# Patient Record
Sex: FEMALE | Race: WHITE | NOT HISPANIC OR LATINO | Employment: UNEMPLOYED | URBAN - NONMETROPOLITAN AREA
[De-identification: names, ages, dates, MRNs, and addresses within clinical notes are randomized per-mention and may not be internally consistent; named-entity substitution may affect disease eponyms.]

---

## 2023-08-20 ENCOUNTER — APPOINTMENT (OUTPATIENT)
Dept: GENERAL RADIOLOGY | Facility: HOSPITAL | Age: 38
DRG: 638 | End: 2023-08-20
Payer: COMMERCIAL

## 2023-08-20 ENCOUNTER — APPOINTMENT (OUTPATIENT)
Dept: CT IMAGING | Facility: HOSPITAL | Age: 38
DRG: 638 | End: 2023-08-20
Payer: COMMERCIAL

## 2023-08-20 ENCOUNTER — HOSPITAL ENCOUNTER (INPATIENT)
Facility: HOSPITAL | Age: 38
LOS: 1 days | Discharge: LEFT BEFORE DISCHARGE | DRG: 638 | End: 2023-08-22
Attending: EMERGENCY MEDICINE | Admitting: HOSPITALIST
Payer: COMMERCIAL

## 2023-08-20 DIAGNOSIS — M86.071 ACUTE HEMATOGENOUS OSTEOMYELITIS OF RIGHT FOOT: Primary | ICD-10-CM

## 2023-08-20 DIAGNOSIS — M86.9 OSTEOMYELITIS OF RIGHT FOOT, UNSPECIFIED TYPE: ICD-10-CM

## 2023-08-20 LAB
ALBUMIN SERPL-MCNC: 3.5 G/DL (ref 3.5–5.2)
ALBUMIN/GLOB SERPL: 0.8 G/DL
ALP SERPL-CCNC: 82 U/L (ref 39–117)
ALT SERPL W P-5'-P-CCNC: <5 U/L (ref 1–33)
ANION GAP SERPL CALCULATED.3IONS-SCNC: 12 MMOL/L (ref 5–15)
ANISOCYTOSIS BLD QL: NORMAL
AST SERPL-CCNC: 12 U/L (ref 1–32)
BASOPHILS # BLD AUTO: 0.12 10*3/MM3 (ref 0–0.2)
BASOPHILS NFR BLD AUTO: 1.3 % (ref 0–1.5)
BILIRUB SERPL-MCNC: <0.2 MG/DL (ref 0–1.2)
BUN SERPL-MCNC: 12 MG/DL (ref 6–20)
BUN/CREAT SERPL: 15.2 (ref 7–25)
CALCIUM SPEC-SCNC: 9.4 MG/DL (ref 8.6–10.5)
CHLORIDE SERPL-SCNC: 105 MMOL/L (ref 98–107)
CO2 SERPL-SCNC: 23 MMOL/L (ref 22–29)
CREAT SERPL-MCNC: 0.79 MG/DL (ref 0.57–1)
CRP SERPL-MCNC: 4.45 MG/DL (ref 0–0.5)
D-LACTATE SERPL-SCNC: 1.2 MMOL/L (ref 0.5–2)
DEPRECATED RDW RBC AUTO: 53.6 FL (ref 37–54)
EGFRCR SERPLBLD CKD-EPI 2021: 97.7 ML/MIN/1.73
EOSINOPHIL # BLD AUTO: 0.38 10*3/MM3 (ref 0–0.4)
EOSINOPHIL NFR BLD AUTO: 4.1 % (ref 0.3–6.2)
ERYTHROCYTE [DISTWIDTH] IN BLOOD BY AUTOMATED COUNT: 19.5 % (ref 12.3–15.4)
ERYTHROCYTE [SEDIMENTATION RATE] IN BLOOD: 124 MM/HR (ref 0–20)
GLOBULIN UR ELPH-MCNC: 4.4 GM/DL
GLUCOSE SERPL-MCNC: 93 MG/DL (ref 65–99)
HCG SERPL QL: NEGATIVE
HCT VFR BLD AUTO: 29.9 % (ref 34–46.6)
HGB BLD-MCNC: 8.6 G/DL (ref 12–15.9)
HOLD SPECIMEN: NORMAL
HYPOCHROMIA BLD QL: NORMAL
IMM GRANULOCYTES # BLD AUTO: 0.02 10*3/MM3 (ref 0–0.05)
IMM GRANULOCYTES NFR BLD AUTO: 0.2 % (ref 0–0.5)
LYMPHOCYTES # BLD AUTO: 3.54 10*3/MM3 (ref 0.7–3.1)
LYMPHOCYTES NFR BLD AUTO: 38 % (ref 19.6–45.3)
MAGNESIUM SERPL-MCNC: 1.9 MG/DL (ref 1.6–2.6)
MCH RBC QN AUTO: 22.2 PG (ref 26.6–33)
MCHC RBC AUTO-ENTMCNC: 28.8 G/DL (ref 31.5–35.7)
MCV RBC AUTO: 77.1 FL (ref 79–97)
MONOCYTES # BLD AUTO: 0.65 10*3/MM3 (ref 0.1–0.9)
MONOCYTES NFR BLD AUTO: 7 % (ref 5–12)
NEUTROPHILS NFR BLD AUTO: 4.6 10*3/MM3 (ref 1.7–7)
NEUTROPHILS NFR BLD AUTO: 49.4 % (ref 42.7–76)
NRBC BLD AUTO-RTO: 0 /100 WBC (ref 0–0.2)
PLATELET # BLD AUTO: 454 10*3/MM3 (ref 140–450)
PMV BLD AUTO: 10.4 FL (ref 6–12)
POTASSIUM SERPL-SCNC: 3.9 MMOL/L (ref 3.5–5.2)
PROCALCITONIN SERPL-MCNC: 0.04 NG/ML (ref 0–0.25)
PROT SERPL-MCNC: 7.9 G/DL (ref 6–8.5)
RBC # BLD AUTO: 3.88 10*6/MM3 (ref 3.77–5.28)
SMALL PLATELETS BLD QL SMEAR: NORMAL
SODIUM SERPL-SCNC: 140 MMOL/L (ref 136–145)
WBC NRBC COR # BLD: 9.31 10*3/MM3 (ref 3.4–10.8)
WHOLE BLOOD HOLD COAG: NORMAL
WHOLE BLOOD HOLD SPECIMEN: NORMAL

## 2023-08-20 PROCEDURE — 73630 X-RAY EXAM OF FOOT: CPT | Performed by: RADIOLOGY

## 2023-08-20 PROCEDURE — 85025 COMPLETE CBC W/AUTO DIFF WBC: CPT | Performed by: PHYSICIAN ASSISTANT

## 2023-08-20 PROCEDURE — 25010000002 HYDROMORPHONE 1 MG/ML SOLUTION: Performed by: EMERGENCY MEDICINE

## 2023-08-20 PROCEDURE — 84145 PROCALCITONIN (PCT): CPT | Performed by: PHYSICIAN ASSISTANT

## 2023-08-20 PROCEDURE — 36415 COLL VENOUS BLD VENIPUNCTURE: CPT

## 2023-08-20 PROCEDURE — 85652 RBC SED RATE AUTOMATED: CPT | Performed by: PHYSICIAN ASSISTANT

## 2023-08-20 PROCEDURE — 87040 BLOOD CULTURE FOR BACTERIA: CPT | Performed by: PHYSICIAN ASSISTANT

## 2023-08-20 PROCEDURE — 84703 CHORIONIC GONADOTROPIN ASSAY: CPT | Performed by: PHYSICIAN ASSISTANT

## 2023-08-20 PROCEDURE — 73630 X-RAY EXAM OF FOOT: CPT

## 2023-08-20 PROCEDURE — 87205 SMEAR GRAM STAIN: CPT | Performed by: PHYSICIAN ASSISTANT

## 2023-08-20 PROCEDURE — 83735 ASSAY OF MAGNESIUM: CPT | Performed by: PHYSICIAN ASSISTANT

## 2023-08-20 PROCEDURE — 83605 ASSAY OF LACTIC ACID: CPT | Performed by: PHYSICIAN ASSISTANT

## 2023-08-20 PROCEDURE — 99285 EMERGENCY DEPT VISIT HI MDM: CPT

## 2023-08-20 PROCEDURE — 86140 C-REACTIVE PROTEIN: CPT | Performed by: PHYSICIAN ASSISTANT

## 2023-08-20 PROCEDURE — 87070 CULTURE OTHR SPECIMN AEROBIC: CPT | Performed by: PHYSICIAN ASSISTANT

## 2023-08-20 PROCEDURE — 85007 BL SMEAR W/DIFF WBC COUNT: CPT | Performed by: PHYSICIAN ASSISTANT

## 2023-08-20 PROCEDURE — 73700 CT LOWER EXTREMITY W/O DYE: CPT

## 2023-08-20 PROCEDURE — 80053 COMPREHEN METABOLIC PANEL: CPT | Performed by: PHYSICIAN ASSISTANT

## 2023-08-20 PROCEDURE — 83036 HEMOGLOBIN GLYCOSYLATED A1C: CPT | Performed by: HOSPITALIST

## 2023-08-20 PROCEDURE — 73700 CT LOWER EXTREMITY W/O DYE: CPT | Performed by: RADIOLOGY

## 2023-08-20 RX ADMIN — HYDROMORPHONE HYDROCHLORIDE 1 MG: 1 INJECTION, SOLUTION INTRAMUSCULAR; INTRAVENOUS; SUBCUTANEOUS at 22:00

## 2023-08-20 NOTE — ED NOTES
MEDICAL SCREENING:    Reason for Visit: right foot infection    Patient initially seen in triage.  The patient was advised further evaluation and diagnostic testing will be needed, some of the treatment and testing will be initiated in the lobby in order to begin the process.  The patient will be returned to the waiting area for the time being and possibly be re-assessed by a subsequent ED provider.  The patient will be brought back to the treatment area in as timely manner as possible.      Caroline Us, PA  08/20/23 2053

## 2023-08-21 PROBLEM — M86.9 OSTEOMYELITIS OF RIGHT FOOT: Status: ACTIVE | Noted: 2023-08-21

## 2023-08-21 LAB
ALBUMIN SERPL-MCNC: 2.9 G/DL (ref 3.5–5.2)
ALBUMIN/GLOB SERPL: 0.7 G/DL
ALP SERPL-CCNC: 68 U/L (ref 39–117)
ALT SERPL W P-5'-P-CCNC: 6 U/L (ref 1–33)
ANION GAP SERPL CALCULATED.3IONS-SCNC: 11.7 MMOL/L (ref 5–15)
AST SERPL-CCNC: 11 U/L (ref 1–32)
BASOPHILS # BLD AUTO: 0.07 10*3/MM3 (ref 0–0.2)
BASOPHILS NFR BLD AUTO: 1 % (ref 0–1.5)
BILIRUB SERPL-MCNC: <0.2 MG/DL (ref 0–1.2)
BUN SERPL-MCNC: 9 MG/DL (ref 6–20)
BUN/CREAT SERPL: 12 (ref 7–25)
CALCIUM SPEC-SCNC: 8.7 MG/DL (ref 8.6–10.5)
CHLORIDE SERPL-SCNC: 105 MMOL/L (ref 98–107)
CO2 SERPL-SCNC: 22.3 MMOL/L (ref 22–29)
CREAT SERPL-MCNC: 0.75 MG/DL (ref 0.57–1)
CRP SERPL-MCNC: 2.98 MG/DL (ref 0–0.5)
DEPRECATED RDW RBC AUTO: 51.1 FL (ref 37–54)
EGFRCR SERPLBLD CKD-EPI 2021: 104 ML/MIN/1.73
EOSINOPHIL # BLD AUTO: 0.28 10*3/MM3 (ref 0–0.4)
EOSINOPHIL NFR BLD AUTO: 3.9 % (ref 0.3–6.2)
ERYTHROCYTE [DISTWIDTH] IN BLOOD BY AUTOMATED COUNT: 19 % (ref 12.3–15.4)
FLUAV RNA RESP QL NAA+PROBE: NOT DETECTED
FLUBV RNA ISLT QL NAA+PROBE: NOT DETECTED
GLOBULIN UR ELPH-MCNC: 4.1 GM/DL
GLUCOSE BLDC GLUCOMTR-MCNC: 103 MG/DL (ref 70–130)
GLUCOSE BLDC GLUCOMTR-MCNC: 105 MG/DL (ref 70–130)
GLUCOSE BLDC GLUCOMTR-MCNC: 106 MG/DL (ref 70–130)
GLUCOSE BLDC GLUCOMTR-MCNC: 107 MG/DL (ref 70–130)
GLUCOSE BLDC GLUCOMTR-MCNC: 113 MG/DL (ref 70–130)
GLUCOSE SERPL-MCNC: 85 MG/DL (ref 65–99)
HBA1C MFR BLD: 5 % (ref 4.8–5.6)
HCT VFR BLD AUTO: 28.2 % (ref 34–46.6)
HGB BLD-MCNC: 8.6 G/DL (ref 12–15.9)
IMM GRANULOCYTES # BLD AUTO: 0.02 10*3/MM3 (ref 0–0.05)
IMM GRANULOCYTES NFR BLD AUTO: 0.3 % (ref 0–0.5)
LYMPHOCYTES # BLD AUTO: 2.76 10*3/MM3 (ref 0.7–3.1)
LYMPHOCYTES NFR BLD AUTO: 38.5 % (ref 19.6–45.3)
MCH RBC QN AUTO: 22.6 PG (ref 26.6–33)
MCHC RBC AUTO-ENTMCNC: 30.5 G/DL (ref 31.5–35.7)
MCV RBC AUTO: 74 FL (ref 79–97)
MONOCYTES # BLD AUTO: 0.57 10*3/MM3 (ref 0.1–0.9)
MONOCYTES NFR BLD AUTO: 8 % (ref 5–12)
NEUTROPHILS NFR BLD AUTO: 3.46 10*3/MM3 (ref 1.7–7)
NEUTROPHILS NFR BLD AUTO: 48.3 % (ref 42.7–76)
NRBC BLD AUTO-RTO: 0 /100 WBC (ref 0–0.2)
PLATELET # BLD AUTO: 422 10*3/MM3 (ref 140–450)
PMV BLD AUTO: 10.4 FL (ref 6–12)
POTASSIUM SERPL-SCNC: 3.3 MMOL/L (ref 3.5–5.2)
POTASSIUM SERPL-SCNC: 4.5 MMOL/L (ref 3.5–5.2)
PROT SERPL-MCNC: 7 G/DL (ref 6–8.5)
RBC # BLD AUTO: 3.81 10*6/MM3 (ref 3.77–5.28)
SARS-COV-2 RNA RESP QL NAA+PROBE: NOT DETECTED
SODIUM SERPL-SCNC: 139 MMOL/L (ref 136–145)
WBC NRBC COR # BLD: 7.16 10*3/MM3 (ref 3.4–10.8)

## 2023-08-21 PROCEDURE — 25010000002 LABETALOL 5 MG/ML SOLUTION

## 2023-08-21 PROCEDURE — 25010000002 MORPHINE PER 10 MG: Performed by: INTERNAL MEDICINE

## 2023-08-21 PROCEDURE — 99222 1ST HOSP IP/OBS MODERATE 55: CPT | Performed by: SURGERY

## 2023-08-21 PROCEDURE — 80053 COMPREHEN METABOLIC PANEL: CPT | Performed by: HOSPITALIST

## 2023-08-21 PROCEDURE — 84132 ASSAY OF SERUM POTASSIUM: CPT | Performed by: HOSPITALIST

## 2023-08-21 PROCEDURE — 82948 REAGENT STRIP/BLOOD GLUCOSE: CPT

## 2023-08-21 PROCEDURE — 25010000002 MEROPENEM PER 100 MG: Performed by: HOSPITALIST

## 2023-08-21 PROCEDURE — 25010000002 VANCOMYCIN 5 G RECONSTITUTED SOLUTION: Performed by: HOSPITALIST

## 2023-08-21 PROCEDURE — 25010000002 HYDRALAZINE PER 20 MG: Performed by: HOSPITALIST

## 2023-08-21 PROCEDURE — 25010000002 ERTAPENEM PER 500 MG: Performed by: PHYSICIAN ASSISTANT

## 2023-08-21 PROCEDURE — 25010000002 VANCOMYCIN 5 G RECONSTITUTED SOLUTION: Performed by: PHYSICIAN ASSISTANT

## 2023-08-21 PROCEDURE — 87636 SARSCOV2 & INF A&B AMP PRB: CPT | Performed by: EMERGENCY MEDICINE

## 2023-08-21 PROCEDURE — 25010000002 MORPHINE PER 10 MG: Performed by: HOSPITALIST

## 2023-08-21 PROCEDURE — 85025 COMPLETE CBC W/AUTO DIFF WBC: CPT | Performed by: HOSPITALIST

## 2023-08-21 PROCEDURE — 86140 C-REACTIVE PROTEIN: CPT | Performed by: HOSPITALIST

## 2023-08-21 RX ORDER — POLYETHYLENE GLYCOL 3350 17 G/17G
17 POWDER, FOR SOLUTION ORAL DAILY PRN
Status: DISCONTINUED | OUTPATIENT
Start: 2023-08-21 | End: 2023-08-22 | Stop reason: HOSPADM

## 2023-08-21 RX ORDER — TOPIRAMATE 25 MG/1
100 TABLET ORAL DAILY
Status: CANCELLED | OUTPATIENT
Start: 2023-08-21

## 2023-08-21 RX ORDER — NICOTINE 21 MG/24HR
1 PATCH, TRANSDERMAL 24 HOURS TRANSDERMAL
Status: DISCONTINUED | OUTPATIENT
Start: 2023-08-21 | End: 2023-08-22 | Stop reason: HOSPADM

## 2023-08-21 RX ORDER — ALLOPURINOL 100 MG/1
100 TABLET ORAL DAILY
Status: CANCELLED | OUTPATIENT
Start: 2023-08-21

## 2023-08-21 RX ORDER — BUSPIRONE HYDROCHLORIDE 15 MG/1
15 TABLET ORAL 3 TIMES DAILY PRN
COMMUNITY

## 2023-08-21 RX ORDER — BUPROPION HYDROCHLORIDE 150 MG/1
150 TABLET, EXTENDED RELEASE ORAL 2 TIMES DAILY
Status: CANCELLED | OUTPATIENT
Start: 2023-08-21

## 2023-08-21 RX ORDER — SODIUM CHLORIDE 9 MG/ML
40 INJECTION, SOLUTION INTRAVENOUS AS NEEDED
Status: DISCONTINUED | OUTPATIENT
Start: 2023-08-21 | End: 2023-08-22 | Stop reason: HOSPADM

## 2023-08-21 RX ORDER — SEMAGLUTIDE 1.34 MG/ML
1 INJECTION, SOLUTION SUBCUTANEOUS WEEKLY
COMMUNITY

## 2023-08-21 RX ORDER — CLONIDINE HYDROCHLORIDE 0.3 MG/1
0.3 TABLET ORAL 2 TIMES DAILY
COMMUNITY

## 2023-08-21 RX ORDER — BUMETANIDE 1 MG/1
1 TABLET ORAL DAILY
COMMUNITY

## 2023-08-21 RX ORDER — INSULIN LISPRO 100 [IU]/ML
2-7 INJECTION, SOLUTION INTRAVENOUS; SUBCUTANEOUS
Status: DISCONTINUED | OUTPATIENT
Start: 2023-08-21 | End: 2023-08-22 | Stop reason: HOSPADM

## 2023-08-21 RX ORDER — CLONIDINE HYDROCHLORIDE 0.3 MG/1
0.3 TABLET ORAL 2 TIMES DAILY
Status: CANCELLED | OUTPATIENT
Start: 2023-08-21

## 2023-08-21 RX ORDER — VANCOMYCIN/0.9 % SOD CHLORIDE 1.5G/250ML
1500 PLASTIC BAG, INJECTION (ML) INTRAVENOUS EVERY 12 HOURS
Status: DISCONTINUED | OUTPATIENT
Start: 2023-08-21 | End: 2023-08-22 | Stop reason: HOSPADM

## 2023-08-21 RX ORDER — IBUPROFEN 800 MG/1
800 TABLET ORAL EVERY 8 HOURS PRN
Status: CANCELLED | OUTPATIENT
Start: 2023-08-21

## 2023-08-21 RX ORDER — LABETALOL HYDROCHLORIDE 5 MG/ML
10 INJECTION, SOLUTION INTRAVENOUS ONCE
Status: COMPLETED | OUTPATIENT
Start: 2023-08-21 | End: 2023-08-21

## 2023-08-21 RX ORDER — ALLOPURINOL 100 MG/1
100 TABLET ORAL DAILY
COMMUNITY

## 2023-08-21 RX ORDER — HYDROXYZINE HYDROCHLORIDE 25 MG/1
25 TABLET, FILM COATED ORAL NIGHTLY PRN
Status: DISCONTINUED | OUTPATIENT
Start: 2023-08-21 | End: 2023-08-22 | Stop reason: HOSPADM

## 2023-08-21 RX ORDER — NICOTINE POLACRILEX 4 MG
15 LOZENGE BUCCAL
Status: DISCONTINUED | OUTPATIENT
Start: 2023-08-21 | End: 2023-08-22 | Stop reason: HOSPADM

## 2023-08-21 RX ORDER — BUMETANIDE 1 MG/1
1 TABLET ORAL DAILY
Status: CANCELLED | OUTPATIENT
Start: 2023-08-21

## 2023-08-21 RX ORDER — GLUCAGON 1 MG/ML
1 KIT INJECTION
Status: DISCONTINUED | OUTPATIENT
Start: 2023-08-21 | End: 2023-08-22 | Stop reason: HOSPADM

## 2023-08-21 RX ORDER — AMOXICILLIN 250 MG
2 CAPSULE ORAL 2 TIMES DAILY
Status: DISCONTINUED | OUTPATIENT
Start: 2023-08-21 | End: 2023-08-22 | Stop reason: HOSPADM

## 2023-08-21 RX ORDER — SODIUM CHLORIDE 0.9 % (FLUSH) 0.9 %
20 SYRINGE (ML) INJECTION AS NEEDED
Status: DISCONTINUED | OUTPATIENT
Start: 2023-08-21 | End: 2023-08-22 | Stop reason: HOSPADM

## 2023-08-21 RX ORDER — SODIUM CHLORIDE 9 MG/ML
100 INJECTION, SOLUTION INTRAVENOUS CONTINUOUS
Status: DISCONTINUED | OUTPATIENT
Start: 2023-08-21 | End: 2023-08-22

## 2023-08-21 RX ORDER — SODIUM CHLORIDE 0.9 % (FLUSH) 0.9 %
10 SYRINGE (ML) INJECTION EVERY 12 HOURS SCHEDULED
Status: DISCONTINUED | OUTPATIENT
Start: 2023-08-21 | End: 2023-08-22 | Stop reason: HOSPADM

## 2023-08-21 RX ORDER — POTASSIUM CHLORIDE 20 MEQ/1
40 TABLET, EXTENDED RELEASE ORAL EVERY 4 HOURS
Status: COMPLETED | OUTPATIENT
Start: 2023-08-21 | End: 2023-08-21

## 2023-08-21 RX ORDER — HEPARIN SODIUM (PORCINE) LOCK FLUSH IV SOLN 100 UNIT/ML 100 UNIT/ML
5 SOLUTION INTRAVENOUS AS NEEDED
Status: DISCONTINUED | OUTPATIENT
Start: 2023-08-21 | End: 2023-08-22 | Stop reason: HOSPADM

## 2023-08-21 RX ORDER — GABAPENTIN 400 MG/1
800 CAPSULE ORAL EVERY 8 HOURS PRN
Status: CANCELLED | OUTPATIENT
Start: 2023-08-21

## 2023-08-21 RX ORDER — BISACODYL 5 MG/1
5 TABLET, DELAYED RELEASE ORAL DAILY PRN
Status: DISCONTINUED | OUTPATIENT
Start: 2023-08-21 | End: 2023-08-22 | Stop reason: HOSPADM

## 2023-08-21 RX ORDER — SODIUM CHLORIDE 0.9 % (FLUSH) 0.9 %
10 SYRINGE (ML) INJECTION AS NEEDED
Status: DISCONTINUED | OUTPATIENT
Start: 2023-08-21 | End: 2023-08-22 | Stop reason: HOSPADM

## 2023-08-21 RX ORDER — QUETIAPINE FUMARATE 300 MG/1
300 TABLET, FILM COATED ORAL NIGHTLY
Status: CANCELLED | OUTPATIENT
Start: 2023-08-21

## 2023-08-21 RX ORDER — BISACODYL 10 MG
10 SUPPOSITORY, RECTAL RECTAL DAILY PRN
Status: DISCONTINUED | OUTPATIENT
Start: 2023-08-21 | End: 2023-08-22 | Stop reason: HOSPADM

## 2023-08-21 RX ORDER — FERROUS SULFATE 325(65) MG
325 TABLET ORAL
COMMUNITY

## 2023-08-21 RX ORDER — MORPHINE SULFATE 2 MG/ML
2 INJECTION, SOLUTION INTRAMUSCULAR; INTRAVENOUS ONCE
Status: COMPLETED | OUTPATIENT
Start: 2023-08-21 | End: 2023-08-21

## 2023-08-21 RX ORDER — BUPROPION HYDROCHLORIDE 150 MG/1
150 TABLET, EXTENDED RELEASE ORAL 2 TIMES DAILY
COMMUNITY

## 2023-08-21 RX ORDER — TOPIRAMATE 100 MG/1
100 TABLET, FILM COATED ORAL DAILY
COMMUNITY

## 2023-08-21 RX ORDER — QUETIAPINE FUMARATE 300 MG/1
300 TABLET, FILM COATED ORAL NIGHTLY
COMMUNITY

## 2023-08-21 RX ORDER — HYDRALAZINE HYDROCHLORIDE 20 MG/ML
10 INJECTION INTRAMUSCULAR; INTRAVENOUS EVERY 6 HOURS PRN
Status: DISCONTINUED | OUTPATIENT
Start: 2023-08-21 | End: 2023-08-22 | Stop reason: HOSPADM

## 2023-08-21 RX ORDER — GABAPENTIN 800 MG/1
800 TABLET ORAL 4 TIMES DAILY PRN
COMMUNITY

## 2023-08-21 RX ORDER — MORPHINE SULFATE 2 MG/ML
2 INJECTION, SOLUTION INTRAMUSCULAR; INTRAVENOUS EVERY 4 HOURS PRN
Status: DISCONTINUED | OUTPATIENT
Start: 2023-08-21 | End: 2023-08-21

## 2023-08-21 RX ORDER — DEXTROSE MONOHYDRATE 25 G/50ML
25 INJECTION, SOLUTION INTRAVENOUS
Status: DISCONTINUED | OUTPATIENT
Start: 2023-08-21 | End: 2023-08-22 | Stop reason: HOSPADM

## 2023-08-21 RX ORDER — FERROUS SULFATE 325(65) MG
325 TABLET ORAL
Status: CANCELLED | OUTPATIENT
Start: 2023-08-21

## 2023-08-21 RX ORDER — LOSARTAN POTASSIUM 50 MG/1
50 TABLET ORAL
Status: DISCONTINUED | OUTPATIENT
Start: 2023-08-21 | End: 2023-08-22

## 2023-08-21 RX ORDER — IBUPROFEN 800 MG/1
800 TABLET ORAL EVERY 8 HOURS PRN
COMMUNITY

## 2023-08-21 RX ADMIN — LABETALOL HYDROCHLORIDE 10 MG: 5 INJECTION, SOLUTION INTRAVENOUS at 20:18

## 2023-08-21 RX ADMIN — NICOTINE TRANSDERMAL SYSTEM 1 PATCH: 21 PATCH, EXTENDED RELEASE TRANSDERMAL at 18:13

## 2023-08-21 RX ADMIN — Medication 10 ML: at 08:35

## 2023-08-21 RX ADMIN — SODIUM CHLORIDE 100 ML/HR: 9 INJECTION, SOLUTION INTRAVENOUS at 02:10

## 2023-08-21 RX ADMIN — MORPHINE SULFATE 4 MG: 4 INJECTION, SOLUTION INTRAMUSCULAR; INTRAVENOUS at 20:18

## 2023-08-21 RX ADMIN — Medication 10 ML: at 02:10

## 2023-08-21 RX ADMIN — LOSARTAN POTASSIUM 50 MG: 50 TABLET, FILM COATED ORAL at 18:13

## 2023-08-21 RX ADMIN — ERTAPENEM 1000 MG: 1 INJECTION INTRAMUSCULAR; INTRAVENOUS at 00:25

## 2023-08-21 RX ADMIN — MORPHINE SULFATE 4 MG: 4 INJECTION, SOLUTION INTRAMUSCULAR; INTRAVENOUS at 17:10

## 2023-08-21 RX ADMIN — MORPHINE SULFATE 4 MG: 4 INJECTION, SOLUTION INTRAMUSCULAR; INTRAVENOUS at 22:47

## 2023-08-21 RX ADMIN — Medication 10 ML: at 20:20

## 2023-08-21 RX ADMIN — MUPIROCIN 1 APPLICATION: 20 OINTMENT TOPICAL at 08:35

## 2023-08-21 RX ADMIN — MORPHINE SULFATE 4 MG: 4 INJECTION, SOLUTION INTRAMUSCULAR; INTRAVENOUS at 15:07

## 2023-08-21 RX ADMIN — MORPHINE SULFATE 2 MG: 2 INJECTION, SOLUTION INTRAMUSCULAR; INTRAVENOUS at 03:58

## 2023-08-21 RX ADMIN — MORPHINE SULFATE 4 MG: 4 INJECTION, SOLUTION INTRAMUSCULAR; INTRAVENOUS at 12:35

## 2023-08-21 RX ADMIN — VANCOMYCIN HYDROCHLORIDE 2500 MG: 5 INJECTION, POWDER, LYOPHILIZED, FOR SOLUTION INTRAVENOUS at 00:24

## 2023-08-21 RX ADMIN — POTASSIUM CHLORIDE 40 MEQ: 1500 TABLET, EXTENDED RELEASE ORAL at 10:22

## 2023-08-21 RX ADMIN — HYDRALAZINE HYDROCHLORIDE 10 MG: 20 INJECTION INTRAMUSCULAR; INTRAVENOUS at 23:04

## 2023-08-21 RX ADMIN — HYDROXYZINE HYDROCHLORIDE 25 MG: 25 TABLET, FILM COATED ORAL at 20:34

## 2023-08-21 RX ADMIN — HYDRALAZINE HYDROCHLORIDE 10 MG: 20 INJECTION INTRAMUSCULAR; INTRAVENOUS at 15:25

## 2023-08-21 RX ADMIN — MEROPENEM 1000 MG: 1 INJECTION, POWDER, FOR SOLUTION INTRAVENOUS at 08:35

## 2023-08-21 RX ADMIN — VANCOMYCIN HYDROCHLORIDE 1500 MG: 5 INJECTION, POWDER, LYOPHILIZED, FOR SOLUTION INTRAVENOUS at 18:13

## 2023-08-21 RX ADMIN — MORPHINE SULFATE 2 MG: 2 INJECTION, SOLUTION INTRAMUSCULAR; INTRAVENOUS at 02:32

## 2023-08-21 RX ADMIN — MEROPENEM 1000 MG: 1 INJECTION, POWDER, FOR SOLUTION INTRAVENOUS at 01:06

## 2023-08-21 RX ADMIN — MORPHINE SULFATE 4 MG: 4 INJECTION, SOLUTION INTRAMUSCULAR; INTRAVENOUS at 08:35

## 2023-08-21 RX ADMIN — POTASSIUM CHLORIDE 40 MEQ: 1500 TABLET, EXTENDED RELEASE ORAL at 06:07

## 2023-08-21 RX ADMIN — MEROPENEM 1000 MG: 1 INJECTION, POWDER, FOR SOLUTION INTRAVENOUS at 15:25

## 2023-08-21 RX ADMIN — MUPIROCIN 1 APPLICATION: 20 OINTMENT TOPICAL at 20:20

## 2023-08-21 NOTE — PROGRESS NOTES
Pharmacokinetics Service Note:    Ms. Solano has been evaluated for vancomycin dosing to treat her R diabetic foot infection.  She was receiving vancomycin at home post discharge from [facility] around 8/8.  She received a 2.5 gm dose ~ midnight here today.  Based on her estimated normal renal function, demographics, and predicted pharmacokinetic parameters, will continue dosing from this evening at 1.5 gm q12hrs to target an AUC range of 500-600 mg/L*hr.  Will plan to obtain a level within the next 48 hours to determine if any adjustments are needed.    Thank you.  Sandee Alfaro, Pharm.D.  8/21/2023  15:06 EDT

## 2023-08-21 NOTE — PROGRESS NOTES
Pharmacy was consulted to dose merrem for osteomyelitis. Based on an estimated CrCl of greater than 120mL/min, an extended infusion merrem dose of 1g q8hr has been ordered. Thank you for the consult.     Thank you,   Paola Martinez, PharmD  00:39 EDT

## 2023-08-21 NOTE — PLAN OF CARE
Goal Outcome Evaluation:              Outcome Evaluation: Pt is resting in bed at this time. Pt has complained of pain throughout the shift, PRN medication given per MAR. Wound care completed per orders. Will continue plan of care.

## 2023-08-21 NOTE — CASE MANAGEMENT/SOCIAL WORK
Discharge Planning Assessment   Alexys     Patient Name: Megan Solano  MRN: [id number]  Today's Date: 8/21/2023    Admit Date: 8/20/2023    Plan: CM spoke with pt at the bedside. Pt lives at home in [facility] with her father and plans to return at d/c. Pt has W/C, crutches and knee scooter per self owns. Pt has [facility] and uses [facility] for IV antibiotic infusions contacted per Rex to stop delivery for now. She goes to wound care clinic. PCP is Nupur MONCADA and she gets rx filled at [facility] in [place]. Pt has [employer] insurance and denies any issues with copays. CM will follow and assist as needed.   Discharge Needs Assessment       Row Name 08/21/23 0507       Living Environment    People in Home parent(s)    Name(s) of People in Home Father Colleen [phone]    Current Living Arrangements home    Primary Care Provided by self    Provides Primary Care For no one, unable/limited ability to care for self    Family Caregiver if Needed parent(s)    Quality of Family Relationships supportive    Able to Return to Prior Arrangements yes       Resource/Environmental Concerns    Resource/Environmental Concerns none    Transportation Concerns none       Transition Planning    Patient/Family Anticipates Transition to home    Transportation Anticipated family or friend will provide       Discharge Needs Assessment    Current Outpatient/Agency/Support Group infusion therapy, outpatient;homecare agency    Equipment Currently Used at Home wheelchair;crutches;other (see comments)  Knee scooter owns    Anticipated Changes Related to Illness none    Equipment Needed After Discharge none    Outpatient/Agency/Support Group Needs infusion therapy, outpatient;homecare agency;clinic(s)    Patient's Choice of Community Agency(s) Pt has [facility]              Discharge Plan       Row Name 08/21/23 3501       Plan    Plan CM spoke with pt at the bedside. Pt lives at home in [facility] with her father and plans to return at  d/c. Pt has W/C, crutches and knee scooter per self owns. Pt has [facility] and uses [facility] for IV antibiotic infusions contacted per Rex to stop delivery for now. She goes to wound care clinic. PCP is Nupur MONCADA and she gets rx filled at [facility] in [place]. Pt has [employer] insurance and denies any issues with copays. CM will follow and assist as needed.    Patient/Family in Agreement with Plan yes               Expected Discharge Date and Time       Expected Discharge Date Expected Discharge Time    Aug 24, 2023                 Harmony Hodgson, RN

## 2023-08-21 NOTE — ED PROVIDER NOTES
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 Pt states 5 different doctors have wanted to amputate her foot but she was hoping to save it. Has a PICC line she has been getting vancomycin and ertapenem through.   Pt has been diagnosed with Charcot foot several months ago and has been non weight bearing   Wants second opinion   Pt has hx of DM, tobacco abuse     History provided by:  Patient   used: No    Foot Pain  Severity:  Moderate  Onset quality:  Sudden  Duration:  9 months  Timing:  Constant  Progression:  Worsening  Chronicity:  Chronic  Relieved by:  Nothing  Worsened by:  Ambulation  Ineffective treatments:  None    Review of Systems    No past medical history on file.    Allergies   Allergen Reactions    Penicillins Hives and Shortness Of Breath    Amoxicillin Hives    Lisinopril Cough    Toradol [Ketorolac Tromethamine] Myalgia    Zofran [Ondansetron] GI Intolerance    Latex Rash       No past surgical history on file.    No family history on file.    Social History     Socioeconomic History    Marital status: Single           Objective   Physical Exam  Vitals and nursing note reviewed.   Constitutional:       Appearance: She is well-developed.   HENT:      Head: Normocephalic.   Cardiovascular:      Rate and Rhythm: Normal rate and regular rhythm.   Pulmonary:      Effort: Pulmonary effort is normal.      Breath sounds: Normal breath sounds.   Abdominal:      General: Bowel sounds are normal.      Palpations: Abdomen is soft.      Tenderness: There is no abdominal tenderness.   Musculoskeletal:         General: Normal range of motion.      Cervical back: Neck supple.   Skin:     General: Skin is warm and dry.   Neurological:      Mental Status: She is alert and oriented to person, place, and time.   Psychiatric:         Behavior: Behavior normal.         Thought Content: Thought content  normal.         Judgment: Judgment normal.       Procedures           ED Course  ED Course as of 08/21/23 0041   Mon Aug 21, 2023   0001 CT lower extremity rad interpreted:  1. Large soft tissue defect along the plantar aspect of the lateral  midfoot as above. There appears to be bony destruction within the  adjacent cuboid very suggestive of acute osteomyelitis. MRI is  recommended for further evaluation.  2. Mid foot collapse with findings consistent with Charcot arthropathy.   [RB]      ED Course User Index  [RB] Mau Batista II, PA      Results for orders placed or performed during the hospital encounter of 08/20/23   Wound Culture - Wound, Foot, Right    Specimen: Foot, Right; Wound   Result Value Ref Range    Gram Stain Occasional Epithelial cells seen     Gram Stain No organisms seen    Comprehensive Metabolic Panel    Specimen: Arm, Right; Blood   Result Value Ref Range    Glucose 93 65 - 99 mg/dL    BUN 12 6 - 20 mg/dL    Creatinine 0.79 0.57 - 1.00 mg/dL    Sodium 140 136 - 145 mmol/L    Potassium 3.9 3.5 - 5.2 mmol/L    Chloride 105 98 - 107 mmol/L    CO2 23.0 22.0 - 29.0 mmol/L    Calcium 9.4 8.6 - 10.5 mg/dL    Total Protein 7.9 6.0 - 8.5 g/dL    Albumin 3.5 3.5 - 5.2 g/dL    ALT (SGPT) <5 1 - 33 U/L    AST (SGOT) 12 1 - 32 U/L    Alkaline Phosphatase 82 39 - 117 U/L    Total Bilirubin <0.2 0.0 - 1.2 mg/dL    Globulin 4.4 gm/dL    A/G Ratio 0.8 g/dL    BUN/Creatinine Ratio 15.2 7.0 - 25.0    Anion Gap 12.0 5.0 - 15.0 mmol/L    eGFR 97.7 >60.0 mL/min/1.73   hCG, Serum, Qualitative    Specimen: Arm, Right; Blood   Result Value Ref Range    HCG Qualitative Negative Negative   Lactic Acid, Plasma    Specimen: Arm, Right; Blood   Result Value Ref Range    Lactate 1.2 0.5 - 2.0 mmol/L   Procalcitonin    Specimen: Arm, Right; Blood   Result Value Ref Range    Procalcitonin 0.04 0.00 - 0.25 ng/mL   Sedimentation Rate    Specimen: Arm, Right; Blood   Result Value Ref Range    Sed Rate 124 (H) 0 - 20 mm/hr    C-reactive Protein    Specimen: Arm, Right; Blood   Result Value Ref Range    C-Reactive Protein 4.45 (H) 0.00 - 0.50 mg/dL   Magnesium    Specimen: Arm, Right; Blood   Result Value Ref Range    Magnesium 1.9 1.6 - 2.6 mg/dL   CBC Auto Differential    Specimen: Arm, Right; Blood   Result Value Ref Range    WBC 9.31 3.40 - 10.80 10*3/mm3    RBC 3.88 3.77 - 5.28 10*6/mm3    Hemoglobin 8.6 (L) 12.0 - 15.9 g/dL    Hematocrit 29.9 (L) 34.0 - 46.6 %    MCV 77.1 (L) 79.0 - 97.0 fL    MCH 22.2 (L) 26.6 - 33.0 pg    MCHC 28.8 (L) 31.5 - 35.7 g/dL    RDW 19.5 (H) 12.3 - 15.4 %    RDW-SD 53.6 37.0 - 54.0 fl    MPV 10.4 6.0 - 12.0 fL    Platelets 454 (H) 140 - 450 10*3/mm3    Neutrophil % 49.4 42.7 - 76.0 %    Lymphocyte % 38.0 19.6 - 45.3 %    Monocyte % 7.0 5.0 - 12.0 %    Eosinophil % 4.1 0.3 - 6.2 %    Basophil % 1.3 0.0 - 1.5 %    Immature Grans % 0.2 0.0 - 0.5 %    Neutrophils, Absolute 4.60 1.70 - 7.00 10*3/mm3    Lymphocytes, Absolute 3.54 (H) 0.70 - 3.10 10*3/mm3    Monocytes, Absolute 0.65 0.10 - 0.90 10*3/mm3    Eosinophils, Absolute 0.38 0.00 - 0.40 10*3/mm3    Basophils, Absolute 0.12 0.00 - 0.20 10*3/mm3    Immature Grans, Absolute 0.02 0.00 - 0.05 10*3/mm3    nRBC 0.0 0.0 - 0.2 /100 WBC   Scan Slide    Specimen: Arm, Right; Blood   Result Value Ref Range    Anisocytosis Mod/2+ None Seen    Hypochromia Mod/2+ None Seen    Platelet Estimate Increased Normal   Green Top (Gel)   Result Value Ref Range    Extra Tube Hold for add-ons.    Lavender Top   Result Value Ref Range    Extra Tube hold for add-on    Light Blue Top   Result Value Ref Range    Extra Tube Hold for add-ons.                                            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 Pt states 5 different doctors have wanted to amputate her foot but she was hoping to save it. Has a PICC line she has been getting vancomycin and ertapenem through.   Pt has  been diagnosed with Charcot foot several months ago and has been non weight bearing   Wants second opinion   Pt has hx of DM, tobacco abuse     Problems Addressed:  Osteomyelitis of right foot, unspecified type: acute illness or injury     Details: Very concerning findings on CT imaging of the right foot showing osteomyelitis.  Would feel this would probably result in very poor prognosis of amputation.  Patient is aware that amputation is definitely within the possibility.  Patient has been seen by other facilities and unfortunately has received the same news.  Currently receiving outpatient antibiotics through a PICC line in her left upper extremity.  Patient is on vancomycin and Invanz.    Amount and/or Complexity of Data Reviewed  External Data Reviewed: notes.  Labs: ordered.  Radiology: ordered. Decision-making details documented in ED Course.    Risk  Decision regarding hospitalization.        Final diagnoses:   Osteomyelitis of right foot, unspecified type       ED Disposition  ED Disposition       ED Disposition   Decision to Admit    Condition   --    Comment   Level of Care: Med/Surg [1]   Diagnosis: Osteomyelitis of right foot [647083]   Admitting Physician: TANIYA WATTS [[id number]]   Attending Physician: TANIYA WATTS [[id number]]   Certification: I Certify That Inpatient Hospital Services Are Medically Necessary For Greater Than 2 Midnights                 No follow-up provider specified.       Medication List      No changes were made to your prescriptions during this visit.            Mau Batista II, PA  08/21/23 0041

## 2023-08-21 NOTE — PLAN OF CARE
Goal Outcome Evaluation:  Plan of Care Reviewed With: patient        Progress: no change  Outcome Evaluation: Pt. arrived from ER this shift, physical assesment complete. Pt did not want full skin assesment, denies any further issues at this time. Pt. refused surgery bath and NPO status. PA aware. Pt ambulated to and from bathroom this shift. Wound care on R foot completed with MD at bedside. Pt. had complaints of pain, PRN meds given, see MAR. No acute changes at this time. Will continue with plan of care.

## 2023-08-21 NOTE — H&P
Hospitalist History and Physical        Patient Identification  Name: Megan Solano  Age/Sex: 38 y.o. female  :  1985        MRN: [id number]  Visit Number: [id number]  Admit Date: 2023   PCP: Nupur Cohn APRN          Chief complaint painful right foot ulcer, worsening drainage and redness    History of Present Illness:  Patient is a 38 y.o. female with history of charcot foot due to type II DM (insulin dependent), HTN and psoriatic arthritis who was recently admitted to [facility] earlier this month for management of diabetic ulcer on the arch of her right foot. She was found to have osteomyelitis and surgery recommended amputation of the foot, but she refused, so she states two bones were resected from her foot instead. She was placed on IV vancomycin and invanz and ultimately discharged home around 23 with a PICC line and instructions to continue IV antibiotics for 6 additional weeks. She has been going a wound care clinic since discharge as well. However, despite taking the antibiotics and going to wound care clinic, her wound has worsened in severity, with increased pain, surrounding redness, and purulent drainage, along with streaking redness up into her lower leg in recent days. As a result, she came to our ED to get a second opinion regarding her foot. In the ED, she was noted to be moderately hypertensive with SBP up to 179. She was not tachycardic, however. Labs showed elevated CRP 4.45, elevated sed rate 124, but normal WBC. Hemoglobin was low at 8.6 but the last records we have for comparison are from , at which time it was 14. CT right lower extremity showed large soft tissue defect along the plantar aspect of the lateral midfoot with appearance of bony destruction within the adjacent cuboid very suggestive of acute osteomyelitis.     Review of Systems  Review of Systems   Constitutional:  Negative for activity change, appetite change, chills,  diaphoresis, fatigue, fever and unexpected weight change.   HENT:  Negative for congestion, postnasal drip, rhinorrhea, sinus pressure, sinus pain and sore throat.    Eyes:  Negative for photophobia, pain, discharge, redness, itching and visual disturbance.   Respiratory:  Negative for cough, shortness of breath and wheezing.    Cardiovascular:  Positive for leg swelling (right lower leg). Negative for chest pain and palpitations.   Gastrointestinal:  Negative for abdominal distention, abdominal pain, constipation, diarrhea, nausea and vomiting.   Endocrine: Negative for cold intolerance, heat intolerance, polydipsia, polyphagia and polyuria.   Genitourinary:  Negative for difficulty urinating, dysuria, flank pain, frequency and hematuria.   Musculoskeletal:  Positive for joint swelling (right ankle) and myalgias (right foot and lower leg). Negative for arthralgias, back pain, neck pain and neck stiffness.   Skin:  Positive for color change (redness right foot streaking up lower leg) and wound (plantar surface right lateral foot). Negative for pallor and rash.   Neurological:  Negative for dizziness, tremors, seizures, syncope, weakness, light-headedness, numbness and headaches.   Hematological:  Negative for adenopathy. Does not bruise/bleed easily.   Psychiatric/Behavioral:  Negative for agitation, behavioral problems and confusion.      History  Past Medical History:   Diagnosis Date    Charcot foot due to diabetes mellitus     right foot    Hypertension     Psoriatic arthritis     Type II diabetes mellitus     insulin dependent     Past Surgical History:   Procedure Laterality Date    CHOLECYSTECTOMY      FOOT SURGERY Right     had 2 bones removed early Aug 2023 for management of osteomyelitis     History reviewed. No pertinent family history.  Social History     Tobacco Use    Smoking status: Every Day     Packs/day: 1.00     Years: 10.00     Pack years: 10.00     Types: Cigarettes     Passive exposure: Current     Smokeless tobacco: Never   Vaping Use    Vaping Use: Some days    Substances: Nicotine    Devices: Disposable    Passive vaping exposure: Yes   Substance Use Topics    Alcohol use: Never    Drug use: Never     No medications prior to admission.     Allergies:  Penicillins, Amoxicillin, Lisinopril, Toradol [ketorolac tromethamine], Zofran [ondansetron], and Latex    Objective     Vital Signs  Temp:  [98.1 øF (36.7 øC)-99 øF (37.2 øC)] 98.1 øF (36.7 øC)  Heart Rate:  [72-87] 75  Resp:  [16-18] 18  BP: (140-210)/(76-92) 210/90  Body mass index is 37.57 kg/mý.    Physical Exam:  Physical Exam  Constitutional:       General: She is not in acute distress.     Appearance: She is obese. She is not ill-appearing (but appears uncomfortable due to pain).   HENT:      Head: Normocephalic and atraumatic.      Right Ear: External ear normal.      Left Ear: External ear normal.      Nose: Nose normal.      Mouth/Throat:      Mouth: Mucous membranes are moist.      Pharynx: Oropharynx is clear.   Eyes:      Extraocular Movements: Extraocular movements intact.      Conjunctiva/sclera: Conjunctivae normal.      Pupils: Pupils are equal, round, and reactive to light.   Cardiovascular:      Rate and Rhythm: Normal rate and regular rhythm.      Heart sounds: No murmur heard.     Comments: Pedal pulse palpable left foot, more diminished in right but likely due to edema  Pulmonary:      Effort: Pulmonary effort is normal. No respiratory distress.      Breath sounds: Normal breath sounds. No wheezing or rales.   Abdominal:      General: Abdomen is flat. Bowel sounds are normal. There is no distension.      Palpations: Abdomen is soft.      Tenderness: There is no abdominal tenderness.   Musculoskeletal:         General: Normal range of motion.      Cervical back: Normal range of motion and neck supple. No tenderness.      Right lower leg: Edema (2+ pitting) present.      Left lower leg: Edema (1+ pitting) present.   Lymphadenopathy:       Cervical: No cervical adenopathy.   Skin:     General: Skin is warm and dry.      Coloration: Skin is not jaundiced.      Findings: Erythema (right foot and lower leg) and lesion (plantar aspect of lateral right foot, about half dollar in diameter, deep tracking, muscle and bone visible. Appears to track through the foot completely with pinhole ulceration on dorsal aspect of lateral foot.) present. No bruising.      Comments: Findings consistent with venous stasis dermatitis bilateral lower legs.    Neurological:      General: No focal deficit present.      Mental Status: She is alert and oriented to person, place, and time.   Psychiatric:      Comments: Affect is tearful         Results Review:       Lab Results:  Results from last 7 days   Lab Units 08/20/23  1752   WBC 10*3/mm3 9.31   HEMOGLOBIN g/dL 8.6*   PLATELETS 10*3/mm3 454*     Results from last 7 days   Lab Units 08/20/23  1752   CRP mg/dL 4.45*     Results from last 7 days   Lab Units 08/20/23  1752   SODIUM mmol/L 140   POTASSIUM mmol/L 3.9   CHLORIDE mmol/L 105   CO2 mmol/L 23.0   BUN mg/dL 12   CREATININE mg/dL 0.79   CALCIUM mg/dL 9.4   GLUCOSE mg/dL 93     Results from last 7 days   Lab Units 08/20/23  1752   MAGNESIUM mg/dL 1.9     No results found for: HGBA1C  Results from last 7 days   Lab Units 08/20/23  1752   BILIRUBIN mg/dL <0.2   ALK PHOS U/L 82   AST (SGOT) U/L 12   ALT (SGPT) U/L <5                       I have reviewed the patient's laboratory results.    Imaging:  Imaging Results (Last 72 Hours)       Procedure Component Value Units Date/Time    CT Lower Extremity Right Without Contrast [[id number]] Collected: 08/20/23 2256     Updated: 08/20/23 2357    Narrative:      Examination: CT right lower extremity without contrast     CLINICAL HISTORY: Foot swelling. Infection. Diabetes.     COMPARISON: None.     TECHNIQUE: Contiguous 3 mm thick slices were obtained through the ankle  and foot without contrast. Coronal and sagittal reformats  were  performed.     FINDINGS:  There is abundant edema within the subcutaneous fat throughout the lower  leg. There is a large soft tissue defect along the plantar aspect of the  lateral midfoot. This extends to the plantar aspect of the cuboid and  adjacent third cuneiform. There is focal bony destruction along the  anterior margin of the lateral cuboid adjacent to the defect. Although  not entirely specific, the findings strongly suggest osteomyelitis. MRI  should be considered for further evaluation. Additionally, there is  marked malalignment with loss of the normal plantar arch and collapse of  the midfoot with superimposed degenerative changes. The findings are  consistent with Charcot arthropathy. No definite acute fracture is  appreciated although comparison with prior imaging would be helpful to  evaluate for change. A portion of the fifth metatarsal base is absent.  This has smooth margins and is likely postsurgical in nature although  correlation with prior imaging and history is recommended.       Impression:      1. Large soft tissue defect along the plantar aspect of the lateral  midfoot as above. There appears to be bony destruction within the  adjacent cuboid very suggestive of acute osteomyelitis. MRI is  recommended for further evaluation.  2. Mid foot collapse with findings consistent with Charcot arthropathy.     This report was finalized on 8/20/2023 11:55 PM by Jose Tay MD.       XR Foot 3+ View Right [[id number]] Collected: 08/20/23 2041     Updated: 08/20/23 2043    Narrative:      3 views of the right foot     Indications: Gangrene foot infection     Findings:     3 views of the right foot show bony erosion and destruction at the base  of the fourth and fifth metatarsals.  There is also bony erosion and  destruction involving the lateral cuneiform and the cuboid.  Overlying  soft tissue swelling is present.       Impression:      Impression:     Bony erosion and destruction compatible  with osteomyelitis involving the  base of the fourth and fifth metatarsals as well as the lateral  cuneiform and cuboid bones.     This report was finalized on 8/20/2023 8:41 PM by Leonard Shaffer MD.               I have personally reviewed the patient's radiologic imaging.        EKG: not ordered        Assessment & Plan     - Infected diabetic foot ulcer right foot, with underlying osteomyelitis and surrounding cellulitis: wound worsening despite taking IV vancomycin and invanz per patient. Therefore will escalate to IV vanc and merrem. Consult general surgery to evaluate for debridement vs amputation. Keep NPO since already after midnight. Will make IV morphine available PRN. Request records from [facility] where patent was hospitalized and underwent surgery on the affected foot earlier in the month.  - Type II DM, insulin dependent: check A1c. Monitor accuchecks, cover with SSI.  - Hypertension, uncontrolled: SBP hailee to >200 upon arrival to the floor. Suspect at least partially due to uncontrolled pain. Increase IV morphine dose. Will make IV hydralazine available PRN as well.   - Psoriatic arthritis: hold sotyktu (tyrosine kinase inhibitor) given its immunosuppressant properties in light of active infection above. Supportive care for now.   - Obesity, BMI 37.     DVT Prophylaxis: SCD to left leg only    Estimated Length of Stay >2 midnights    I discussed the patient's findings, assessment and plan with the patient and nursing staff on [facility].    Patient is high risk due to infected diabetic foot ulcer with osteomyelitis and surrounding cellulitis requiring parenteral opioids for pain control, at risk for loss of limb    Chip Hayes MD  08/21/23  03:07 EDT

## 2023-08-21 NOTE — PAYOR COMM NOTE
"CONTACT: KATE IVAN RN  UTILIZATION MANAGEMENT DEPT.  [address] [address]  PHONE: [phone]  FAX: [phone]      INPATIENT AUTH REQUEST    Member id: [id number]          Be Car (38 y.o. Female)       Date of Birth   1985    Social Security Number       Address   [address] [id number]    Home Phone   [phone]    MRN   [id number]       Episcopalian   Unknown    Marital Status   Single                            Admission Date   8/20/23    Admission Type   Emergency    Admitting Provider   Chip Hayes MD    Attending Provider   Jeramie Andrade DO    Department, Room/Bed   [address], [facility]       Discharge Date       Discharge Disposition       Discharge Destination                                 Attending Provider: Jeramie Andrade DO    Allergies: Penicillins, Amoxicillin, Lisinopril, Toradol [Ketorolac Tromethamine], Zofran [Ondansetron], Latex    Isolation: None   Infection: None   Code Status: CPR    Ht: 182.9 cm (72\")   Wt: 142 kg (313 lb 0.9 oz)    Admission Cmt: None   Principal Problem: Osteomyelitis of right foot [M86.9]                   Active Insurance as of 8/20/2023       Primary Coverage       Payor Plan Insurance Group Employer/Plan Group    [employer] [place] [employer]ICA[employer]        Payor Plan Address Payor Plan Phone Number Payor Plan Fax Number Effective Dates    [address] [address] [phone]  8/20/2023 - None Entered    [facility] [id number]         Subscriber Name Subscriber Birth Date Member ID       BE CAR 1985 [id number]                     Emergency Contacts        (Rel.) Home Phone Work Phone Mobile Phone    TONNY GARLAND (Father) -- -- [phone]                 History & Physical        Chip Hayes MD at 08/21/23 0304          Hospitalist History and Physical        Patient Identification  Name: Be Car  Age/Sex: 38 " y.o. female  :  1985        MRN: [id number]  Visit Number: [id number]  Admit Date: 2023   PCP: Nupur Cohn APRN          Chief complaint painful right foot ulcer, worsening drainage and redness    History of Present Illness:  Patient is a 38 y.o. female with history of charcot foot due to type II DM (insulin dependent), HTN and psoriatic arthritis who was recently admitted to [facility] earlier this month for management of diabetic ulcer on the arch of her right foot. She was found to have osteomyelitis and surgery recommended amputation of the foot, but she refused, so she states two bones were resected from her foot instead. She was placed on IV vancomycin and invanz and ultimately discharged home around 23 with a PICC line and instructions to continue IV antibiotics for 6 additional weeks. She has been going a wound care clinic since discharge as well. However, despite taking the antibiotics and going to wound care clinic, her wound has worsened in severity, with increased pain, surrounding redness, and purulent drainage, along with streaking redness up into her lower leg in recent days. As a result, she came to our ED to get a second opinion regarding her foot. In the ED, she was noted to be moderately hypertensive with SBP up to 179. She was not tachycardic, however. Labs showed elevated CRP 4.45, elevated sed rate 124, but normal WBC. Hemoglobin was low at 8.6 but the last records we have for comparison are from , at which time it was 14. CT right lower extremity showed large soft tissue defect along the plantar aspect of the lateral midfoot with appearance of bony destruction within the adjacent cuboid very suggestive of acute osteomyelitis.     Review of Systems  Review of Systems   Constitutional:  Negative for activity change, appetite change, chills, diaphoresis, fatigue, fever and unexpected weight change.   HENT:  Negative for congestion, postnasal  drip, rhinorrhea, sinus pressure, sinus pain and sore throat.    Eyes:  Negative for photophobia, pain, discharge, redness, itching and visual disturbance.   Respiratory:  Negative for cough, shortness of breath and wheezing.    Cardiovascular:  Positive for leg swelling (right lower leg). Negative for chest pain and palpitations.   Gastrointestinal:  Negative for abdominal distention, abdominal pain, constipation, diarrhea, nausea and vomiting.   Endocrine: Negative for cold intolerance, heat intolerance, polydipsia, polyphagia and polyuria.   Genitourinary:  Negative for difficulty urinating, dysuria, flank pain, frequency and hematuria.   Musculoskeletal:  Positive for joint swelling (right ankle) and myalgias (right foot and lower leg). Negative for arthralgias, back pain, neck pain and neck stiffness.   Skin:  Positive for color change (redness right foot streaking up lower leg) and wound (plantar surface right lateral foot). Negative for pallor and rash.   Neurological:  Negative for dizziness, tremors, seizures, syncope, weakness, light-headedness, numbness and headaches.   Hematological:  Negative for adenopathy. Does not bruise/bleed easily.   Psychiatric/Behavioral:  Negative for agitation, behavioral problems and confusion.      History  Past Medical History:   Diagnosis Date    Charcot foot due to diabetes mellitus     right foot    Hypertension     Psoriatic arthritis     Type II diabetes mellitus     insulin dependent     Past Surgical History:   Procedure Laterality Date    CHOLECYSTECTOMY      FOOT SURGERY Right     had 2 bones removed early Aug 2023 for management of osteomyelitis     History reviewed. No pertinent family history.  Social History     Tobacco Use    Smoking status: Every Day     Packs/day: 1.00     Years: 10.00     Pack years: 10.00     Types: Cigarettes     Passive exposure: Current    Smokeless tobacco: Never   Vaping Use    Vaping Use: Some days    Substances: Nicotine    Devices:  Disposable    Passive vaping exposure: Yes   Substance Use Topics    Alcohol use: Never    Drug use: Never     No medications prior to admission.     Allergies:  Penicillins, Amoxicillin, Lisinopril, Toradol [ketorolac tromethamine], Zofran [ondansetron], and Latex    Objective    Vital Signs  Temp:  [98.1 øF (36.7 øC)-99 øF (37.2 øC)] 98.1 øF (36.7 øC)  Heart Rate:  [72-87] 75  Resp:  [16-18] 18  BP: (140-210)/(76-92) 210/90  Body mass index is 37.57 kg/mý.    Physical Exam:  Physical Exam  Constitutional:       General: She is not in acute distress.     Appearance: She is obese. She is not ill-appearing (but appears uncomfortable due to pain).   HENT:      Head: Normocephalic and atraumatic.      Right Ear: External ear normal.      Left Ear: External ear normal.      Nose: Nose normal.      Mouth/Throat:      Mouth: Mucous membranes are moist.      Pharynx: Oropharynx is clear.   Eyes:      Extraocular Movements: Extraocular movements intact.      Conjunctiva/sclera: Conjunctivae normal.      Pupils: Pupils are equal, round, and reactive to light.   Cardiovascular:      Rate and Rhythm: Normal rate and regular rhythm.      Heart sounds: No murmur heard.     Comments: Pedal pulse palpable left foot, more diminished in right but likely due to edema  Pulmonary:      Effort: Pulmonary effort is normal. No respiratory distress.      Breath sounds: Normal breath sounds. No wheezing or rales.   Abdominal:      General: Abdomen is flat. Bowel sounds are normal. There is no distension.      Palpations: Abdomen is soft.      Tenderness: There is no abdominal tenderness.   Musculoskeletal:         General: Normal range of motion.      Cervical back: Normal range of motion and neck supple. No tenderness.      Right lower leg: Edema (2+ pitting) present.      Left lower leg: Edema (1+ pitting) present.   Lymphadenopathy:      Cervical: No cervical adenopathy.   Skin:     General: Skin is warm and dry.      Coloration: Skin is  not jaundiced.      Findings: Erythema (right foot and lower leg) and lesion (plantar aspect of lateral right foot, about half dollar in diameter, deep tracking, muscle and bone visible. Appears to track through the foot completely with pinhole ulceration on dorsal aspect of lateral foot.) present. No bruising.      Comments: Findings consistent with venous stasis dermatitis bilateral lower legs.    Neurological:      General: No focal deficit present.      Mental Status: She is alert and oriented to person, place, and time.   Psychiatric:      Comments: Affect is tearful         Results Review:       Lab Results:  Results from last 7 days   Lab Units 08/20/23  1752   WBC 10*3/mm3 9.31   HEMOGLOBIN g/dL 8.6*   PLATELETS 10*3/mm3 454*     Results from last 7 days   Lab Units 08/20/23  1752   CRP mg/dL 4.45*     Results from last 7 days   Lab Units 08/20/23  1752   SODIUM mmol/L 140   POTASSIUM mmol/L 3.9   CHLORIDE mmol/L 105   CO2 mmol/L 23.0   BUN mg/dL 12   CREATININE mg/dL 0.79   CALCIUM mg/dL 9.4   GLUCOSE mg/dL 93     Results from last 7 days   Lab Units 08/20/23  1752   MAGNESIUM mg/dL 1.9     No results found for: HGBA1C  Results from last 7 days   Lab Units 08/20/23  1752   BILIRUBIN mg/dL <0.2   ALK PHOS U/L 82   AST (SGOT) U/L 12   ALT (SGPT) U/L <5                       I have reviewed the patient's laboratory results.    Imaging:  Imaging Results (Last 72 Hours)       Procedure Component Value Units Date/Time    CT Lower Extremity Right Without Contrast [[id number]] Collected: 08/20/23 2256     Updated: 08/20/23 1431    Narrative:      Examination: CT right lower extremity without contrast     CLINICAL HISTORY: Foot swelling. Infection. Diabetes.     COMPARISON: None.     TECHNIQUE: Contiguous 3 mm thick slices were obtained through the ankle  and foot without contrast. Coronal and sagittal reformats were  performed.     FINDINGS:  There is abundant edema within the subcutaneous fat throughout the  lower  leg. There is a large soft tissue defect along the plantar aspect of the  lateral midfoot. This extends to the plantar aspect of the cuboid and  adjacent third cuneiform. There is focal bony destruction along the  anterior margin of the lateral cuboid adjacent to the defect. Although  not entirely specific, the findings strongly suggest osteomyelitis. MRI  should be considered for further evaluation. Additionally, there is  marked malalignment with loss of the normal plantar arch and collapse of  the midfoot with superimposed degenerative changes. The findings are  consistent with Charcot arthropathy. No definite acute fracture is  appreciated although comparison with prior imaging would be helpful to  evaluate for change. A portion of the fifth metatarsal base is absent.  This has smooth margins and is likely postsurgical in nature although  correlation with prior imaging and history is recommended.       Impression:      1. Large soft tissue defect along the plantar aspect of the lateral  midfoot as above. There appears to be bony destruction within the  adjacent cuboid very suggestive of acute osteomyelitis. MRI is  recommended for further evaluation.  2. Mid foot collapse with findings consistent with Charcot arthropathy.     This report was finalized on 8/20/2023 11:55 PM by Jose Tay MD.       XR Foot 3+ View Right [[id number]] Collected: 08/20/23 2041     Updated: 08/20/23 2043    Narrative:      3 views of the right foot     Indications: Gangrene foot infection     Findings:     3 views of the right foot show bony erosion and destruction at the base  of the fourth and fifth metatarsals.  There is also bony erosion and  destruction involving the lateral cuneiform and the cuboid.  Overlying  soft tissue swelling is present.       Impression:      Impression:     Bony erosion and destruction compatible with osteomyelitis involving the  base of the fourth and fifth metatarsals as well as the  lateral  cuneiform and cuboid bones.     This report was finalized on 8/20/2023 8:41 PM by Leonard Shaffer MD.               I have personally reviewed the patient's radiologic imaging.        EKG: not ordered        Assessment & Plan    - Infected diabetic foot ulcer right foot, with underlying osteomyelitis and surrounding cellulitis: wound worsening despite taking IV vancomycin and invanz per patient. Therefore will escalate to IV vanc and merrem. Consult general surgery to evaluate for debridement vs amputation. Keep NPO since already after midnight. Will make IV morphine available PRN. Request records from [facility] where patent was hospitalized and underwent surgery on the affected foot earlier in the month.  - Type II DM, insulin dependent: check A1c. Monitor accuchecks, cover with SSI.  - Hypertension, uncontrolled: SBP hailee to >200 upon arrival to the floor. Suspect at least partially due to uncontrolled pain. Increase IV morphine dose. Will make IV hydralazine available PRN as well.   - Psoriatic arthritis: hold sotyktu (tyrosine kinase inhibitor) given its immunosuppressant properties in light of active infection above. Supportive care for now.   - Obesity, BMI 37.     DVT Prophylaxis: SCD to left leg only    Estimated Length of Stay >2 midnights    I discussed the patient's findings, assessment and plan with the patient and nursing staff on [facility].    Patient is high risk due to infected diabetic foot ulcer with osteomyelitis and surrounding cellulitis requiring parenteral opioids for pain control, at risk for loss of limb    Chip Hayes MD  08/21/23  03:07 EDT      Electronically signed by Chip Hayes MD at 08/21/23 0327          Emergency Department Notes        Diana Maurice RN at 08/20/23 2208          Report given to ROGER Spann    Electronically signed by Diana Maurice RN at 08/20/23 2208       Mau Batista II, PA at 08/20/23 2057       Attestation signed by Salvador  Daniel GARCIA MD at 08/21/23 0141        SUPERVISE: For this patient encounter, I reviewed the APC's documentation, treatment plan, and medical decision making.  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 Pt states 5 different doctors have wanted to amputate her foot but she was hoping to save it. Has a PICC line she has been getting vancomycin and ertapenem through.   Pt has been diagnosed with Charcot foot several months ago and has been non weight bearing   Wants second opinion   Pt has hx of DM, tobacco abuse     History provided by:  Patient   used: No    Foot Pain  Severity:  Moderate  Onset quality:  Sudden  Duration:  9 months  Timing:  Constant  Progression:  Worsening  Chronicity:  Chronic  Relieved by:  Nothing  Worsened by:  Ambulation  Ineffective treatments:  None    Review of Systems    No past medical history on file.    Allergies   Allergen Reactions    Penicillins Hives and Shortness Of Breath    Amoxicillin Hives    Lisinopril Cough    Toradol [Ketorolac Tromethamine] Myalgia    Zofran [Ondansetron] GI Intolerance    Latex Rash       No past surgical history on file.    No family history on file.    Social History     Socioeconomic History    Marital status: Single           Objective   Physical Exam  Vitals and nursing note reviewed.   Constitutional:       Appearance: She is well-developed.   HENT:      Head: Normocephalic.   Cardiovascular:      Rate and Rhythm: Normal rate and regular rhythm.   Pulmonary:      Effort: Pulmonary effort is normal.      Breath sounds: Normal breath sounds.   Abdominal:      General: Bowel sounds are normal.      Palpations: Abdomen is soft.      Tenderness: There is no abdominal tenderness.   Musculoskeletal:         General: Normal range of motion.      Cervical back: Neck supple.    Skin:     General: Skin is warm and dry.   Neurological:      Mental Status: She is alert and oriented to person, place, and time.   Psychiatric:         Behavior: Behavior normal.         Thought Content: Thought content normal.         Judgment: Judgment normal.       Procedures          ED Course  ED Course as of 08/21/23 0041   Mon Aug 21, 2023   0001 CT lower extremity rad interpreted:  1. Large soft tissue defect along the plantar aspect of the lateral  midfoot as above. There appears to be bony destruction within the  adjacent cuboid very suggestive of acute osteomyelitis. MRI is  recommended for further evaluation.  2. Mid foot collapse with findings consistent with Charcot arthropathy.   [RB]      ED Course User Index  [RB] Mau Batista II, PA      Results for orders placed or performed during the hospital encounter of 08/20/23   Wound Culture - Wound, Foot, Right    Specimen: Foot, Right; Wound   Result Value Ref Range    Gram Stain Occasional Epithelial cells seen     Gram Stain No organisms seen    Comprehensive Metabolic Panel    Specimen: Arm, Right; Blood   Result Value Ref Range    Glucose 93 65 - 99 mg/dL    BUN 12 6 - 20 mg/dL    Creatinine 0.79 0.57 - 1.00 mg/dL    Sodium 140 136 - 145 mmol/L    Potassium 3.9 3.5 - 5.2 mmol/L    Chloride 105 98 - 107 mmol/L    CO2 23.0 22.0 - 29.0 mmol/L    Calcium 9.4 8.6 - 10.5 mg/dL    Total Protein 7.9 6.0 - 8.5 g/dL    Albumin 3.5 3.5 - 5.2 g/dL    ALT (SGPT) <5 1 - 33 U/L    AST (SGOT) 12 1 - 32 U/L    Alkaline Phosphatase 82 39 - 117 U/L    Total Bilirubin <0.2 0.0 - 1.2 mg/dL    Globulin 4.4 gm/dL    A/G Ratio 0.8 g/dL    BUN/Creatinine Ratio 15.2 7.0 - 25.0    Anion Gap 12.0 5.0 - 15.0 mmol/L    eGFR 97.7 >60.0 mL/min/1.73   hCG, Serum, Qualitative    Specimen: Arm, Right; Blood   Result Value Ref Range    HCG Qualitative Negative Negative   Lactic Acid, Plasma    Specimen: Arm, Right; Blood   Result Value Ref Range    Lactate 1.2 0.5 - 2.0 mmol/L    Procalcitonin    Specimen: Arm, Right; Blood   Result Value Ref Range    Procalcitonin 0.04 0.00 - 0.25 ng/mL   Sedimentation Rate    Specimen: Arm, Right; Blood   Result Value Ref Range    Sed Rate 124 (H) 0 - 20 mm/hr   C-reactive Protein    Specimen: Arm, Right; Blood   Result Value Ref Range    C-Reactive Protein 4.45 (H) 0.00 - 0.50 mg/dL   Magnesium    Specimen: Arm, Right; Blood   Result Value Ref Range    Magnesium 1.9 1.6 - 2.6 mg/dL   CBC Auto Differential    Specimen: Arm, Right; Blood   Result Value Ref Range    WBC 9.31 3.40 - 10.80 10*3/mm3    RBC 3.88 3.77 - 5.28 10*6/mm3    Hemoglobin 8.6 (L) 12.0 - 15.9 g/dL    Hematocrit 29.9 (L) 34.0 - 46.6 %    MCV 77.1 (L) 79.0 - 97.0 fL    MCH 22.2 (L) 26.6 - 33.0 pg    MCHC 28.8 (L) 31.5 - 35.7 g/dL    RDW 19.5 (H) 12.3 - 15.4 %    RDW-SD 53.6 37.0 - 54.0 fl    MPV 10.4 6.0 - 12.0 fL    Platelets 454 (H) 140 - 450 10*3/mm3    Neutrophil % 49.4 42.7 - 76.0 %    Lymphocyte % 38.0 19.6 - 45.3 %    Monocyte % 7.0 5.0 - 12.0 %    Eosinophil % 4.1 0.3 - 6.2 %    Basophil % 1.3 0.0 - 1.5 %    Immature Grans % 0.2 0.0 - 0.5 %    Neutrophils, Absolute 4.60 1.70 - 7.00 10*3/mm3    Lymphocytes, Absolute 3.54 (H) 0.70 - 3.10 10*3/mm3    Monocytes, Absolute 0.65 0.10 - 0.90 10*3/mm3    Eosinophils, Absolute 0.38 0.00 - 0.40 10*3/mm3    Basophils, Absolute 0.12 0.00 - 0.20 10*3/mm3    Immature Grans, Absolute 0.02 0.00 - 0.05 10*3/mm3    nRBC 0.0 0.0 - 0.2 /100 WBC   Scan Slide    Specimen: Arm, Right; Blood   Result Value Ref Range    Anisocytosis Mod/2+ None Seen    Hypochromia Mod/2+ None Seen    Platelet Estimate Increased Normal   Green Top (Gel)   Result Value Ref Range    Extra Tube Hold for add-ons.    Lavender Top   Result Value Ref Range    Extra Tube hold for add-on    Light Blue Top   Result Value Ref Range    Extra Tube Hold for add-ons.                                            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 Pt states 5 different doctors have wanted to amputate her foot but she was hoping to save it. Has a PICC line she has been getting vancomycin and ertapenem through.   Pt has been diagnosed with Charcot foot several months ago and has been non weight bearing   Wants second opinion   Pt has hx of DM, tobacco abuse     Problems Addressed:  Osteomyelitis of right foot, unspecified type: acute illness or injury     Details: Very concerning findings on CT imaging of the right foot showing osteomyelitis.  Would feel this would probably result in very poor prognosis of amputation.  Patient is aware that amputation is definitely within the possibility.  Patient has been seen by other facilities and unfortunately has received the same news.  Currently receiving outpatient antibiotics through a PICC line in her left upper extremity.  Patient is on vancomycin and Invanz.    Amount and/or Complexity of Data Reviewed  External Data Reviewed: notes.  Labs: ordered.  Radiology: ordered. Decision-making details documented in ED Course.    Risk  Decision regarding hospitalization.        Final diagnoses:   Osteomyelitis of right foot, unspecified type       ED Disposition  ED Disposition       ED Disposition   Decision to Admit    Condition   --    Comment   Level of Care: Med/Surg [1]   Diagnosis: Osteomyelitis of right foot [391574]   Admitting Physician: TANIYA WATTS [[id number]]   Attending Physician: TANIYA WATTS [[id number]]   Certification: I Certify That Inpatient Hospital Services Are Medically Necessary For Greater Than 2 Midnights                 No follow-up provider specified.       Medication List      No changes were made to your prescriptions during this visit.            Mau Batista II, PA  08/21/23 0041      Electronically signed by Daniel Franco MD at 08/21/23 0141       Caroline Us PA at 08/20/23 1658       Attestation signed by Daniel Franco MD at  08/21/23 0029    [facility]                           MEDICAL SCREENING:    Reason for Visit: right foot infection    Patient initially seen in triage.  The patient was advised further evaluation and diagnostic testing will be needed, some of the treatment and testing will be initiated in the lobby in order to begin the process.  The patient will be returned to the waiting area for the time being and possibly be re-assessed by a subsequent ED provider.  The patient will be brought back to the treatment area in as timely manner as possible.      Caroline Us PA  08/20/23 1659      Electronically signed by Daniel Franco MD at 08/21/23 0029       Facility-Administered Medications as of 8/21/2023   Medication Dose Route Frequency Provider Last Rate Last Admin    sennosides-docusate (PERICOLACE) 8.6-50 MG per tablet 2 tablet  2 tablet Oral BID Chip Hayes MD        And    polyethylene glycol (MIRALAX) packet 17 g  17 g Oral Daily PRN Chip Hayes MD        And    bisacodyl (DULCOLAX) EC tablet 5 mg  5 mg Oral Daily PRN Chip Hayes MD        And    bisacodyl (DULCOLAX) suppository 10 mg  10 mg Rectal Daily PRN Chip Hayes MD        dextrose (D50W) (25 g/50 mL) IV injection 25 g  25 g Intravenous Q15 Min PRN Chip Hayes MD        dextrose (GLUTOSE) oral gel 15 g  15 g Oral Q15 Min PRN Chip Hayes MD        glucagon HCl (Diagnostic) injection 1 mg  1 mg Intramuscular Q15 Min PRN Chip Hayes MD        hydrALAZINE (APRESOLINE) injection 10 mg  10 mg Intravenous Q6H PRN Chip Hayes MD        [COMPLETED] HYDROmorphone (DILAUDID) injection 1 mg  1 mg Intravenous Once Daniel Franco MD   1 mg at 08/20/23 2200    hydrOXYzine (ATARAX) tablet 25 mg  25 mg Oral Nightly PRN Neema Jennings PA-C        Insulin Lispro (humaLOG) injection 2-7 Units  2-7 Units Subcutaneous 4x Daily AC & at Bedtime Chip Hayes MD        [COMPLETED]  meropenem (MERREM) 1,000 mg in sodium chloride 0.9 % 100 mL IVPB-VTB  1,000 mg Intravenous Once Chip Hayes MD   1,000 mg at 08/21/23 0106    meropenem (MERREM) 1,000 mg in sodium chloride 0.9 % 100 mL IVPB-VTB  1,000 mg Intravenous Q8H Chip Hayes MD        [COMPLETED] morphine injection 2 mg  2 mg Intravenous Once Chip Hayes MD   2 mg at 08/21/23 0358    morphine injection 4 mg  4 mg Intravenous Q4H PRN Chip Hayes MD        mupirocin (BACTROBAN) 2 % nasal ointment 1 application   1 application  Each Nare BID Chip Hayes MD        potassium chloride (K-DUR,KLOR-CON) CR tablet 40 mEq  40 mEq Oral Q4H Chip Hayes MD   40 mEq at 08/21/23 0607    Potassium Replacement - Follow Nurse / BPA Driven Protocol   Does not apply PRN Neema Jennings PA-C        sodium chloride 0.9 % flush 10 mL  10 mL Intravenous Q12H Chip Hayes MD   10 mL at 08/21/23 0210    sodium chloride 0.9 % flush 10 mL  10 mL Intravenous PRN Chip Hyaes MD        sodium chloride 0.9 % infusion 40 mL  40 mL Intravenous PRN Chip Hayes MD        sodium chloride 0.9 % infusion  100 mL/hr Intravenous Continuous Chip Hayes  mL/hr at 08/21/23 0210 100 mL/hr at 08/21/23 0210    [COMPLETED] vancomycin 2500 mg/500 mL 0.9% NS IVPB ([facility])  20 mg/kg Intravenous Once Mau Batista II, PA   2,500 mg at 08/21/23 0024     Lab Results (all)       Procedure Component Value Units Date/Time    POC Glucose Once [[id number]]  (Normal) Collected: 08/21/23 0740    Specimen: Blood Updated: 08/21/23 0747     Glucose 105 mg/dL     Comprehensive Metabolic Panel [[id number]]  (Abnormal) Collected: 08/21/23 0418    Specimen: Blood Updated: 08/21/23 0458     Glucose 85 mg/dL      BUN 9 mg/dL      Creatinine 0.75 mg/dL      Sodium 139 mmol/L      Potassium 3.3 mmol/L      Comment: Slight hemolysis detected by analyzer. Results may be affected.        Chloride 105 mmol/L       CO2 22.3 mmol/L      Calcium 8.7 mg/dL      Total Protein 7.0 g/dL      Albumin 2.9 g/dL      ALT (SGPT) 6 U/L      AST (SGOT) 11 U/L      Alkaline Phosphatase 68 U/L      Total Bilirubin <0.2 mg/dL      Globulin 4.1 gm/dL      A/G Ratio 0.7 g/dL      BUN/Creatinine Ratio 12.0     Anion Gap 11.7 mmol/L      eGFR 104.0 mL/min/1.73     Narrative:      GFR Normal >60  Chronic Kidney Disease <60  Kidney Failure <15      C-reactive Protein [[id number]]  (Abnormal) Collected: 08/21/23 0418    Specimen: Blood Updated: 08/21/23 0458     C-Reactive Protein 2.98 mg/dL     CBC Auto Differential [[id number]]  (Abnormal) Collected: 08/21/23 0418    Specimen: Blood Updated: 08/21/23 0442     WBC 7.16 10*3/mm3      RBC 3.81 10*6/mm3      Hemoglobin 8.6 g/dL      Hematocrit 28.2 %      MCV 74.0 fL      MCH 22.6 pg      MCHC 30.5 g/dL      RDW 19.0 %      RDW-SD 51.1 fl      MPV 10.4 fL      Platelets 422 10*3/mm3      Neutrophil % 48.3 %      Lymphocyte % 38.5 %      Monocyte % 8.0 %      Eosinophil % 3.9 %      Basophil % 1.0 %      Immature Grans % 0.3 %      Neutrophils, Absolute 3.46 10*3/mm3      Lymphocytes, Absolute 2.76 10*3/mm3      Monocytes, Absolute 0.57 10*3/mm3      Eosinophils, Absolute 0.28 10*3/mm3      Basophils, Absolute 0.07 10*3/mm3      Immature Grans, Absolute 0.02 10*3/mm3      nRBC 0.0 /100 WBC     Hemoglobin A1c [[id number]]  (Normal) Collected: 08/20/23 1752    Specimen: Blood from Arm, Right Updated: 08/21/23 0322     Hemoglobin A1C 5.00 %     Narrative:      Hemoglobin A1C Ranges:    Increased Risk for Diabetes  5.7% to 6.4%  Diabetes                     >= 6.5%  Diabetic Goal                < 7.0%    POC Glucose Once [[id number]]  (Normal) Collected: 08/21/23 0213    Specimen: Blood Updated: 08/21/23 0221     Glucose 103 mg/dL     COVID-19 and FLU A/B PCR - Swab, Nasopharynx [[id number]]  (Normal) Collected: 08/21/23 0054    Specimen: Swab from Nasopharynx Updated: 08/21/23 0118     COVID19 Not  Detected     Influenza A PCR Not Detected     Influenza B PCR Not Detected    Narrative:      Fact sheet for providers: https://www.fda.gov/media/126270/download    Fact sheet for patients: https://www.fda.gov/media/330112/download    Test performed by PCR.    Wound Culture - Wound, Foot, Right [[id number]] Collected: 08/20/23 2036    Specimen: Wound from Foot, Right Updated: 08/20/23 2100     Gram Stain Occasional Epithelial cells seen      No organisms seen    [place] Draw [[id number]] Collected: 08/20/23 1752    Specimen: Blood from Arm, Right Updated: 08/20/23 1901    Narrative:      The following orders were created for panel order [place] Draw.  Procedure                               Abnormality         Status                     ---------                               -----------         ------                     Green Top (Gel)[[id number]]                                  Final result               Lavender Top[[id number]]                                     Final result               Gold Top - SST[[id number]]                                                              Light Blue Top[[id number]]                                   Final result                 Please view results for these tests on the individual orders.    Green Top (Gel) [[id number]] Collected: 08/20/23 1752    Specimen: Blood from Arm, Right Updated: 08/20/23 1901     Extra Tube Hold for add-ons.     Comment: Auto resulted.       Lavender Top [[id number]] Collected: 08/20/23 1752    Specimen: Blood from Arm, Right Updated: 08/20/23 1901     Extra Tube hold for add-on     Comment: Auto resulted       Light Blue Top [[id number]] Collected: 08/20/23 1752    Specimen: Blood from Arm, Right Updated: 08/20/23 1901     Extra Tube Hold for add-ons.     Comment: Auto resulted       CBC & Differential [[id number]]  (Abnormal) Collected: 08/20/23 1752    Specimen: Blood from Arm, Right Updated: 08/20/23 1832    Narrative:      The following orders were  created for panel order CBC & Differential.  Procedure                               Abnormality         Status                     ---------                               -----------         ------                     CBC Auto Differential[[id number]]        Abnormal            Final result               Scan Slide[[id number]]                                       Final result                 Please view results for these tests on the individual orders.    CBC Auto Differential [[id number]]  (Abnormal) Collected: 08/20/23 1752    Specimen: Blood from Arm, Right Updated: 08/20/23 1832     WBC 9.31 10*3/mm3      RBC 3.88 10*6/mm3      Hemoglobin 8.6 g/dL      Hematocrit 29.9 %      MCV 77.1 fL      MCH 22.2 pg      MCHC 28.8 g/dL      RDW 19.5 %      RDW-SD 53.6 fl      MPV 10.4 fL      Platelets 454 10*3/mm3      Neutrophil % 49.4 %      Lymphocyte % 38.0 %      Monocyte % 7.0 %      Eosinophil % 4.1 %      Basophil % 1.3 %      Immature Grans % 0.2 %      Neutrophils, Absolute 4.60 10*3/mm3      Lymphocytes, Absolute 3.54 10*3/mm3      Monocytes, Absolute 0.65 10*3/mm3      Eosinophils, Absolute 0.38 10*3/mm3      Basophils, Absolute 0.12 10*3/mm3      Immature Grans, Absolute 0.02 10*3/mm3      nRBC 0.0 /100 WBC     Scan Slide [[id number]] Collected: 08/20/23 1752    Specimen: Blood from Arm, Right Updated: 08/20/23 1832     Anisocytosis Mod/2+     Hypochromia Mod/2+     Platelet Estimate Increased    Procalcitonin [[id number]]  (Normal) Collected: 08/20/23 1752    Specimen: Blood from Arm, Right Updated: 08/20/23 1830     Procalcitonin 0.04 ng/mL     Narrative:      As a Marker for Sepsis (Non-Neonates):    1. <0.5 ng/mL represents a low risk of severe sepsis and/or septic shock.  2. >2 ng/mL represents a high risk of severe sepsis and/or septic shock.    As a Marker for Lower Respiratory Tract Infections that require antibiotic therapy:    PCT on Admission    Antibiotic Therapy       6-12 Hrs later    >0.5             "    Strongly Recommended  >0.25 - <0.5        Recommended   0.1 - 0.25          Discouraged              Remeasure/reassess PCT  <0.1                Strongly Discouraged     Remeasure/reassess PCT    As 28 day mortality risk marker: \"Change in Procalcitonin Result\" (>80% or <=80%) if Day 0 (or Day 1) and Day 4 values are available. Refer to http://[email or web address] [place]cal[facility]    Change in PCT <=80%  A decrease of PCT levels below or equal to 80% defines a positive change in PCT test result representing a higher risk for 28-day all-cause mortality of patients diagnosed with severe sepsis for septic shock.    Change in PCT >80%  A decrease of PCT levels of more than 80% defines a negative change in PCT result representing a lower risk for 28-day all-cause mortality of patients diagnosed with severe sepsis or septic shock.       Comprehensive Metabolic Panel [[id number]] Collected: 08/20/23 1752    Specimen: Blood from Arm, Right Updated: 08/20/23 1822     Glucose 93 mg/dL      BUN 12 mg/dL      Creatinine 0.79 mg/dL      Sodium 140 mmol/L      Potassium 3.9 mmol/L      Chloride 105 mmol/L      CO2 23.0 mmol/L      Calcium 9.4 mg/dL      Total Protein 7.9 g/dL      Albumin 3.5 g/dL      ALT (SGPT) <5 U/L      AST (SGOT) 12 U/L      Alkaline Phosphatase 82 U/L      Total Bilirubin <0.2 mg/dL      Globulin 4.4 gm/dL      A/G Ratio 0.8 g/dL      BUN/Creatinine Ratio 15.2     Anion Gap 12.0 mmol/L      eGFR 97.7 mL/min/1.73     Narrative:      GFR Normal >60  Chronic Kidney Disease <60  Kidney Failure <15      C-reactive Protein [[id number]]  (Abnormal) Collected: 08/20/23 1752    Specimen: Blood from Arm, Right Updated: 08/20/23 1822     C-Reactive Protein 4.45 mg/dL     Magnesium [[id number]]  (Normal) Collected: 08/20/23 1752    Specimen: Blood from Arm, Right Updated: 08/20/23 1822     Magnesium 1.9 mg/dL     Lactic Acid, Plasma [[id number]]  (Normal) Collected: 08/20/23 1752    Specimen: Blood from Arm, Right Updated: " 08/20/23 1818     Lactate 1.2 mmol/L     hCG, Serum, Qualitative [[id number]]  (Normal) Collected: 08/20/23 1752    Specimen: Blood from Arm, Right Updated: 08/20/23 1815     HCG Qualitative Negative    Sedimentation Rate [[id number]]  (Abnormal) Collected: 08/20/23 1752    Specimen: Blood from Arm, Right Updated: 08/20/23 1803     Sed Rate 124 mm/hr     Blood Culture - Blood, Arm, Right [[id number]] Collected: 08/20/23 1736    Specimen: Blood from Arm, Right Updated: 08/20/23 1759    Blood Culture - Blood, Arm, Right [[id number]] Collected: 08/20/23 1753    Specimen: Blood from Arm, Right Updated: 08/20/23 1759          Imaging Results (All)       Procedure Component Value Units Date/Time    CT Lower Extremity Right Without Contrast [[id number]] Collected: 08/20/23 2256     Updated: 08/20/23 2357    Narrative:      Examination: CT right lower extremity without contrast     CLINICAL HISTORY: Foot swelling. Infection. Diabetes.     COMPARISON: None.     TECHNIQUE: Contiguous 3 mm thick slices were obtained through the ankle  and foot without contrast. Coronal and sagittal reformats were  performed.     FINDINGS:  There is abundant edema within the subcutaneous fat throughout the lower  leg. There is a large soft tissue defect along the plantar aspect of the  lateral midfoot. This extends to the plantar aspect of the cuboid and  adjacent third cuneiform. There is focal bony destruction along the  anterior margin of the lateral cuboid adjacent to the defect. Although  not entirely specific, the findings strongly suggest osteomyelitis. MRI  should be considered for further evaluation. Additionally, there is  marked malalignment with loss of the normal plantar arch and collapse of  the midfoot with superimposed degenerative changes. The findings are  consistent with Charcot arthropathy. No definite acute fracture is  appreciated although comparison with prior imaging would be helpful to  evaluate for change. A portion of the  fifth metatarsal base is absent.  This has smooth margins and is likely postsurgical in nature although  correlation with prior imaging and history is recommended.       Impression:      1. Large soft tissue defect along the plantar aspect of the lateral  midfoot as above. There appears to be bony destruction within the  adjacent cuboid very suggestive of acute osteomyelitis. MRI is  recommended for further evaluation.  2. Mid foot collapse with findings consistent with Charcot arthropathy.     This report was finalized on 8/20/2023 11:55 PM by Jose Tay MD.       XR Foot 3+ View Right [[id number]] Collected: 08/20/23 2041     Updated: 08/20/23 2043    Narrative:      3 views of the right foot     Indications: Gangrene foot infection     Findings:     3 views of the right foot show bony erosion and destruction at the base  of the fourth and fifth metatarsals.  There is also bony erosion and  destruction involving the lateral cuneiform and the cuboid.  Overlying  soft tissue swelling is present.       Impression:      Impression:     Bony erosion and destruction compatible with osteomyelitis involving the  base of the fourth and fifth metatarsals as well as the lateral  cuneiform and cuboid bones.     This report was finalized on 8/20/2023 8:41 PM by Leonard Shaffer MD.             Orders (all)        Start     Ordered    08/21/23 1455  Potassium  Timed         08/21/23 0555    08/21/23 0900  sennosides-docusate (PERICOLACE) 8.6-50 MG per tablet 2 tablet  2 Times Daily        See Hyperspace for full Linked Orders Report.    08/21/23 0158    08/21/23 0900  mupirocin (BACTROBAN) 2 % nasal ointment 1 application   2 Times Daily         08/21/23 0639    08/21/23 0800  Vital Signs  Every 8 Hours        Comments: Per per hospital policy    08/21/23 0158    08/21/23 0800  Oral Care  2 Times Daily       08/21/23 0158    08/21/23 0800  meropenem (MERREM) 1,000 mg in sodium chloride 0.9 % 100 mL IVPB-VTB  Every 8 Hours          08/21/23 0037    08/21/23 0749  Diet: Diabetic Diets; Consistent Carbohydrate; Texture: Regular Texture (IDDSI 7); Fluid Consistency: Thin (IDDSI 0)  Diet Effective Now         08/21/23 0748    08/21/23 0748  POC Glucose Once  PROCEDURE ONCE        Comments: Complete no more than 45 minutes prior to patient eating      08/21/23 0740    08/21/23 0730  Insulin Lispro (humaLOG) injection 2-7 Units  4 Times Daily Before Meals & Nightly         08/21/23 0303    08/21/23 0700  POC Glucose 4x Daily Before Meals & at Bedtime  4 Times Daily Before Meals & at Bedtime      Comments: Complete no more than 45 minutes prior to patient eating      08/21/23 0303    08/21/23 0645  potassium chloride (K-DUR,KLOR-CON) CR tablet 40 mEq  Every 4 Hours         08/21/23 0555    08/21/23 0640  Discontinue mupirocin for decolonization after 5 days or sooner if patient no longer has a central venous access device, accessed port, hemodialysis catheter, or midline  Continuous         08/21/23 0639    08/21/23 0600  Strict Intake & Output  Every 6 Hours         08/21/23 0158    08/21/23 0600  CBC Auto Differential  Morning Draw         08/21/23 0158    08/21/23 0600  Comprehensive Metabolic Panel  Morning Draw         08/21/23 0158    08/21/23 0600  C-reactive Protein  Morning Draw         08/21/23 0158    08/21/23 0554  Potassium Replacement - Follow Nurse / BPA Driven Protocol  As Needed         08/21/23 0554    08/21/23 0438  Scan Slide  Once,   Status:  Canceled         08/21/23 0437    08/21/23 0400  morphine injection 2 mg  Once         08/21/23 0302    08/21/23 0340  hydrOXYzine (ATARAX) tablet 25 mg  Nightly PRN         08/21/23 0340    08/21/23 0311  Obtain Medical Records  Until Discontinued        Comments: [facility], regarding recent hospitalization for right foot ulcer with osteomyelitis earlier this month    08/21/23 0311    08/21/23 0304  hydrALAZINE (APRESOLINE) injection 10 mg  Every 6 Hours PRN         08/21/23 0304     08/21/23 0304  Hemoglobin A1c  STAT,   Status:  Canceled         08/21/23 0303    08/21/23 0304  Hemoglobin A1c  STAT         08/21/23 0304    08/21/23 0303  dextrose (GLUTOSE) oral gel 15 g  Every 15 Minutes PRN         08/21/23 0303    08/21/23 0303  dextrose (D50W) (25 g/50 mL) IV injection 25 g  Every 15 Minutes PRN         08/21/23 0303    08/21/23 0303  glucagon HCl (Diagnostic) injection 1 mg  Every 15 Minutes PRN         08/21/23 0303    08/21/23 0302  morphine injection 4 mg  Every 4 Hours PRN         08/21/23 0302    08/21/23 0245  sodium chloride 0.9 % flush 10 mL  Every 12 Hours Scheduled         08/21/23 0158    08/21/23 0245  sodium chloride 0.9 % infusion  Continuous         08/21/23 0158    08/21/23 0231  Inpatient General Surgery Consult  Once        Specialty:  General Surgery  Provider:  (Not yet assigned)    08/21/23 0230    08/21/23 0225  morphine injection 2 mg  Every 4 Hours PRN,   Status:  Discontinued         08/21/23 0226    08/21/23 0222  POC Glucose Once  PROCEDURE ONCE        Comments: Complete no more than 45 minutes prior to patient eating      08/21/23 0213    08/21/23 0159  Notify Physician (with Parameters)  Until Discontinued         08/21/23 0158    08/21/23 0159  Insert Peripheral IV  Once         08/21/23 0158    08/21/23 0159  Saline Lock & Maintain IV Access  Continuous         08/21/23 0158    08/21/23 0159  Place Sequential Compression Device  Once        Comments: Left foot only    08/21/23 0158    08/21/23 0159  Maintain Sequential Compression Device  Continuous        Comments: Left foot only    08/21/23 0158    08/21/23 0159  Pulse Oximetry, Continuous  Continuous         08/21/23 0158    08/21/23 0159  Daily Weights  Daily       08/21/23 0158    08/21/23 0159  NPO Diet NPO Type: Strict NPO  Diet Effective Now,   Status:  Canceled         08/21/23 0158    08/21/23 0158  sodium chloride 0.9 % flush 10 mL  As Needed         08/21/23 0158    08/21/23 0158  sodium chloride  0.9 % infusion 40 mL  As Needed         08/21/23 0158    08/21/23 0158  polyethylene glycol (MIRALAX) packet 17 g  Daily PRN        See Hyperspace for full Linked Orders Report.    08/21/23 0158    08/21/23 0158  bisacodyl (DULCOLAX) EC tablet 5 mg  Daily PRN        See Hyperspace for full Linked Orders Report.    08/21/23 0158    08/21/23 0158  bisacodyl (DULCOLAX) suppository 10 mg  Daily PRN        See Hyperspace for full Linked Orders Report.    08/21/23 0158    08/21/23 0100  meropenem (MERREM) 1,000 mg in sodium chloride 0.9 % 100 mL IVPB-VTB  Once         08/21/23 0037    08/21/23 0052  COVID-19 and FLU A/B PCR - Swab, Nasopharynx  Once         08/21/23 0052    08/21/23 0030  Inpatient Admission  Once         08/21/23 0031    08/21/23 0030  Code Status and Medical Interventions:  Continuous         08/21/23 0031    08/21/23 0027  Pharmacy Consult - Pharmacy to dose  Continuous PRN,   Status:  Discontinued         08/21/23 0028    08/21/23 0022  vancomycin 2500 mg/500 mL 0.9% NS IVPB ([facility])  Once         08/21/23 0006    08/21/23 0022  ertapenem (INVanz) 1,000 mg in sodium chloride 0.9 % 100 mL IVPB-VTB  Once,   Status:  Discontinued         08/21/23 0006    08/20/23 2205  HYDROmorphone (DILAUDID) injection 1 mg  Once         08/20/23 2149 08/20/23 2038  CT Lower Extremity Right Without Contrast  1 Time Imaging         08/20/23 2038 08/20/23 2036  Wound Culture - Wound, Foot, Right  Once         08/20/23 2035 08/20/23 1832  Scan Slide  Once         08/20/23 1831    08/20/23 1700  CBC & Differential  Once         08/20/23 1659    08/20/23 1700  Comprehensive Metabolic Panel  Once         08/20/23 1659    08/20/23 1700  hCG, Serum, Qualitative  STAT         08/20/23 1659    08/20/23 1700  [place] Draw  Once         08/20/23 1659    08/20/23 1700  Blood Culture - Blood, Arm, Right  Once         08/20/23 1659    08/20/23 1700  Blood Culture - Blood, Arm, Right  Once         08/20/23 1659    08/20/23 1700   Lactic Acid, Plasma  Once         08/20/23 1659    08/20/23 1700  Procalcitonin  Once         08/20/23 1659    08/20/23 1700  Sedimentation Rate  Once         08/20/23 1659    08/20/23 1700  C-reactive Protein  Once         08/20/23 1659    08/20/23 1700  Magnesium  Once         08/20/23 1659    08/20/23 1700  XR Foot 3+ View Right  1 Time Imaging         08/20/23 1659    08/20/23 1700  CBC Auto Differential  PROCEDURE ONCE         08/20/23 1700    08/20/23 1700  Green Top (Gel)  PROCEDURE ONCE         08/20/23 1700    08/20/23 1700  Lavender Top  PROCEDURE ONCE         08/20/23 1700    08/20/23 1700  Gold Top - SST  PROCEDURE ONCE,   Status:  Canceled         08/20/23 1700    08/20/23 1700  Light Blue Top  PROCEDURE ONCE         08/20/23 1700    Unscheduled  Up With Assistance  As Needed       08/21/23 0158    Unscheduled  Follow Hypoglycemia Standing Orders For Blood Glucose <70 & Notify Provider of Treatment  As Needed      Comments: Follow Hypoglycemia Orders As Outlined in Process Instructions (Open Order Report to View Full Instructions)  Notify Provider Any Time Hypoglycemia Treatment is Administered    08/21/23 0303                  Physician Progress Notes (all)    No notes of this type exist for this encounter.       Consult Notes (all)    No notes of this type exist for this encounter.

## 2023-08-21 NOTE — CONSULTS
Patient Name:  Megan Solano  YOB: 1985  [id number]       Patient Care Team:  Nupur Cohn APRN as PCP - General (Family Medicine)      General Surgery Consult Note     Date of Consultation: 08/21/23    Consulting Physician - Jeramie Andrade,*    Reason for Consult -concern for osteomyelitis    Subjective     I have been asked to see  Megan Solano , a 38 y.o. morbidly obese female  in consultation for right foot wound and concern for osteomyelitis.  The patient has had a longstanding wound of her right lateral plantar foot.  She has been treated with multiple rounds of IV antibiotics.  She is been told by multiple surgeons that she will require a below-knee amputation.She was most recently admitted to [facility] where she underwent debridements and placed on 6 weeks of IV antibiotics.  However, the patient had increasing pain from the site and was told by her wound care provider to present to an emergency department due to gas gangrene.  The patient presented to [facility] emergency department, where labs were relatively unremarkable.  She was admitted and general surgery consult placed    Patient last ate at 4 AM this morning in the emergency department.  Upon my evaluation, the patient was in no acute distress, wound of the right plantar foot overall clean but does track down to exposed bone.      Allergy:   Allergies   Allergen Reactions    Penicillins Hives and Shortness Of Breath    Amoxicillin Hives    Lisinopril Cough    Toradol [Ketorolac Tromethamine] Myalgia    Zofran [Ondansetron] GI Intolerance    Latex Rash       Medications:  insulin lispro, 2-7 Units, Subcutaneous, 4x Daily AC & at Bedtime  meropenem, 1,000 mg, Intravenous, Q8H  mupirocin, 1 application , Each Nare, BID  senna-docusate sodium, 2 tablet, Oral, BID  sodium chloride, 10 mL, Intravenous, Q12H      sodium chloride, 100 mL/hr, Last Rate: 100 mL/hr (08/21/23 0210)      No current  facility-administered medications on file prior to encounter.     Current Outpatient Medications on File Prior to Encounter   Medication Sig    allopurinol (ZYLOPRIM) 100 MG tablet Take 1 tablet by mouth Daily.    bumetanide (BUMEX) 1 MG tablet Take 1 tablet by mouth Daily.    buPROPion SR (WELLBUTRIN SR) 150 MG 12 hr tablet Take 1 tablet by mouth 2 (Two) Times a Day.    busPIRone (BUSPAR) 15 MG tablet Take 1 tablet by mouth 3 (Three) Times a Day As Needed (anxiety).    cloNIDine (CATAPRES) 0.3 MG tablet Take 1 tablet by mouth 2 (Two) Times a Day.    ferrous sulfate 325 (65 FE) MG tablet Take 1 tablet by mouth 3 (Three) Times a Day With Meals.    gabapentin (NEURONTIN) 800 MG tablet Take 1 tablet by mouth 4 (Four) Times a Day As Needed (nerve pain).    linaclotide (LINZESS) 290 MCG capsule capsule Take 1 capsule by mouth Every Morning Before Breakfast.    metFORMIN (GLUCOPHAGE) 1000 MG tablet Take 1 tablet by mouth 2 (Two) Times a Day With Meals.    QUEtiapine (SEROquel) 300 MG tablet Take 1 tablet by mouth Every Night.    Semaglutide, 1 MG/DOSE, (Ozempic, 1 MG/DOSE,) 4 MG/3ML solution pen-injector Inject 1 mg under the skin into the appropriate area as directed 1 (One) Time Per Week.    topiramate (TOPAMAX) 100 MG tablet Take 1 tablet by mouth Daily.    ibuprofen (ADVIL,MOTRIN) 800 MG tablet Take 1 tablet by mouth Every 8 (Eight) Hours As Needed for Mild Pain.       PMHx:   Past Medical History:   Diagnosis Date    Charcot foot due to diabetes mellitus     right foot    Hypertension     Psoriatic arthritis     Type II diabetes mellitus     insulin dependent         Past Surgical History:  Cholecystectomy  Foot surgery    Family History: Denies    Social History: Smoker     Review of Systems        Constitutional: No fevers, chills or malaise. No unintentional weight loss   Eyes: Denies visual changes    Cardiovascular: Denies chest pain, palpitations   Respiratory: Denies cough or shortness of  "breath   Musculoskeletal: Reports wound on foot.  Reports right lower extremity swelling.              Skin: No lesions or rashes   Psychiatric: No recent mood changes   Neurologic: No paresthesias or loss of function             Objective     Physical Exam:      Vital Signs  /95 (BP Location: Right arm, Patient Position: Lying)   Pulse 81   Temp 98.1 øF (36.7 øC) (Oral)   Resp 20   Ht 182.9 cm (72\")   Wt (!) 142 kg (313 lb 0.9 oz)   SpO2 100%   BMI 42.46 kg/mý     Intake/Output Summary (Last 24 hours) at 8/21/2023 1333  Last data filed at 8/21/2023 0359  Gross per 24 hour   Intake 480 ml   Output --   Net 480 ml         Physical Exam:      08/20/23  1601 08/21/23  0443   Weight: 126 kg (277 lb) (!) 142 kg (313 lb 0.9 oz)    Body mass index is 42.46 kg/mý.  Constitution: No acute distress.  Morbidly obese  Head: Normocephalic, atraumatic.   Eyes: Aligned without strabismus. Conjunctiva noninjected   Ears, Nose, Mouth: , No lesions appreciated   CV: Rhythm  and rate regular   Respiratory: Symmetric chest expansion. No respiratory distress.   Skin: Large right midfoot wound on plantar aspect and laterally.  Bone is palpable deep within the wound.  No surrounding cellulitis.  There is bilateral lower extremity mid calf erythema likely from venous stasis.  Mild right greater than left lower extremity edema  Neurologic: No gross deficits.  Alert and oriented x3  Psychiatric: Normal mood        Results Review: I have personally reviewed all of the recent lab and imaging results available at this time.     Blood blood cell count normal  CRP 4.45    CT of the right lower extremity without contrast was personally reviewed.  Large soft tissue defect within the right lateral plantar midfoot.  There is some mild bony changes.  There is lower extremity edema          Assessment and Plan:    38 y.o. morbidly obese female diabetic smoker with chronic right foot wound and likely chronic osteomyelitis    Patient is " currently on 6-week course of IV antibiotics.  I discussed with the patient that I am skeptical that this wound will heal given her comorbid conditions and its location.  I recommended right below-knee amputation to remove infected source but does not appear to be an acute issue.  We discussed continued IV antibiotics versus upfront surgery.  Defer to patient          Nickolas Juarez MD  [facility] Surgery  08/21/23  13:33 EDT

## 2023-08-21 NOTE — PROGRESS NOTES
[facility] HOSPITALIST PROGRESS NOTE     Patient Identification:  Name:  Megan Solano  Age:  38 y.o.  Sex:  female  :  1985  MRN:  [id number]  Visit Number:  [id number]  Primary Care Provider:  Nupur Cohn APRN    Length of stay:  0    Chief complaint: Right foot pain    Subjective:    Patient seen and examined at bedside with patient's nurse present.  I have seen this patient on 2 separate occasions today to discuss her plan of care.  On initial exam, did discuss with patient the condition of her right foot with osteomyelitis and having failed previous ray resection with prolonged IV antibiotic therapy at home.  Patient was unsure if she wanted to have surgery/BKA to resolve her right diabetic foot infection.  Was contacted by patient's nurse later in the day stating the patient felt confused as though multiple physicians were telling her different plans of care.  As such, I did come back and speak to the patient a second time to emphasize my previously stated opinion that patient is highly unlikely to heal from her current right foot osteomyelitis without definitive surgical intervention (right BKA).  Patient was allowed/given ample opportunity to ask any questions she may have regarding this opinion.  Patient stated she had no further questions and after further discussion with her family, patient did decide to proceed with right BKA.  General surgery services have been notified and will plan for right BKA tomorrow.  ----------------------------------------------------------------------------------------------------------------------  Current Hospital Meds:  insulin lispro, 2-7 Units, Subcutaneous, 4x Daily AC & at Bedtime  meropenem, 1,000 mg, Intravenous, Q8H  mupirocin, 1 application , Each Nare, BID  senna-docusate sodium, 2 tablet, Oral, BID  sodium chloride, 10 mL, Intravenous, Q12H  vancomycin, 1,500 mg, Intravenous, Q12H      sodium chloride, 100 mL/hr, Last  Rate: 100 mL/hr (08/21/23 0210)      ----------------------------------------------------------------------------------------------------------------------  Vital Signs:  Temp:  [98.1 øF (36.7 øC)] 98.1 øF (36.7 øC)  Heart Rate:  [72-87] 81  Resp:  [18-20] 20  BP: (140-210)/(76-96) 184/96      08/20/23  1601 08/21/23  0443   Weight: 126 kg (277 lb) (!) 142 kg (313 lb 0.9 oz)     Body mass index is 42.46 kg/mý.    Intake/Output Summary (Last 24 hours) at 8/21/2023 1646  Last data filed at 8/21/2023 0359  Gross per 24 hour   Intake 480 ml   Output --   Net 480 ml     Diet: Diabetic Diets; Consistent Carbohydrate; Texture: Regular Texture (IDDSI 7); Fluid Consistency: Thin (IDDSI 0)  ----------------------------------------------------------------------------------------------------------------------  Physical exam:  Constitutional: Morbidly obese  female in no apparent distress.     HENT:  Head:  Normocephalic and atraumatic.  Mouth:  Moist mucous membranes.    Eyes:  Conjunctivae and EOM are normal.  Pupils are equal, round, and reactive to light.  No scleral icterus.    Neck:  Neck supple. No thyromegaly.  No JVD present.    Cardiovascular:  Regular rate and rhythm with no murmurs, rubs, clicks or gallops appreciated.  Pulmonary/Chest:  Clear to auscultation bilaterally with no crackles, wheezes or rhonchi appreciated.  Abdominal:  Soft. Nontender. Nondistended  Bowel sounds are normal in all four quadrants. No organomegally appreciated.   Musculoskeletal: Right foot/ankle wrapped in Ace bandage with dressing applied.    Neurological:  Alert, Cranial nerves II-XII intact with no focal deficits.  No facial droop.  No slurred speech.   Skin:  Warm and dry to palpation with no rashes or lesions appreciated.  Peripheral vascular:  2+ radial and pedal pulses in bilateral upper and lower extremities.  Psychiatric:  Alert and oriented x3, demonstrates appropriate judgment and  insight.  -------------------------------------------------------------------------------------------------  ----------------------------------------------------------------------------------------------------------------------      Results from last 7 days   Lab Units 08/21/23  0418 08/20/23  1752   CRP mg/dL 2.98* 4.45*   LACTATE mmol/L  --  1.2   WBC 10*3/mm3 7.16 9.31   HEMOGLOBIN g/dL 8.6* 8.6*   HEMATOCRIT % 28.2* 29.9*   MCV fL 74.0* 77.1*   MCHC g/dL 30.5* 28.8*   PLATELETS 10*3/mm3 422 454*         Results from last 7 days   Lab Units 08/21/23  0418 08/20/23  1752   SODIUM mmol/L 139 140   POTASSIUM mmol/L 3.3* 3.9   MAGNESIUM mg/dL  --  1.9   CHLORIDE mmol/L 105 105   CO2 mmol/L 22.3 23.0   BUN mg/dL 9 12   CREATININE mg/dL 0.75 0.79   CALCIUM mg/dL 8.7 9.4   GLUCOSE mg/dL 85 93   ALBUMIN g/dL 2.9* 3.5   BILIRUBIN mg/dL <0.2 <0.2   ALK PHOS U/L 68 82   AST (SGOT) U/L 11 12   ALT (SGPT) U/L 6 <5   Estimated Creatinine Clearance: 160.6 mL/min (by C-G formula based on SCr of 0.75 mg/dL).    No results found for: AMMONIA      No results found for: BLOODCX  No results found for: URINECX  Wound Culture   Date Value Ref Range Status   08/20/2023 No growth  Preliminary     No results found for: STOOLCX    I have personally looked at the labs and they are summarized above.  ----------------------------------------------------------------------------------------------------------------------  Imaging Results (Last 24 Hours)       Procedure Component Value Units Date/Time    CT Lower Extremity Right Without Contrast [[id number]] Collected: 08/20/23 8187     Updated: 08/20/23 1309    Narrative:      Examination: CT right lower extremity without contrast     CLINICAL HISTORY: Foot swelling. Infection. Diabetes.     COMPARISON: None.     TECHNIQUE: Contiguous 3 mm thick slices were obtained through the ankle  and foot without contrast. Coronal and sagittal reformats were  performed.     FINDINGS:  There is abundant edema  within the subcutaneous fat throughout the lower  leg. There is a large soft tissue defect along the plantar aspect of the  lateral midfoot. This extends to the plantar aspect of the cuboid and  adjacent third cuneiform. There is focal bony destruction along the  anterior margin of the lateral cuboid adjacent to the defect. Although  not entirely specific, the findings strongly suggest osteomyelitis. MRI  should be considered for further evaluation. Additionally, there is  marked malalignment with loss of the normal plantar arch and collapse of  the midfoot with superimposed degenerative changes. The findings are  consistent with Charcot arthropathy. No definite acute fracture is  appreciated although comparison with prior imaging would be helpful to  evaluate for change. A portion of the fifth metatarsal base is absent.  This has smooth margins and is likely postsurgical in nature although  correlation with prior imaging and history is recommended.       Impression:      1. Large soft tissue defect along the plantar aspect of the lateral  midfoot as above. There appears to be bony destruction within the  adjacent cuboid very suggestive of acute osteomyelitis. MRI is  recommended for further evaluation.  2. Mid foot collapse with findings consistent with Charcot arthropathy.     This report was finalized on 8/20/2023 11:55 PM by Jose Tay MD.       XR Foot 3+ View Right [[id number]] Collected: 08/20/23 2041     Updated: 08/20/23 2043    Narrative:      3 views of the right foot     Indications: Gangrene foot infection     Findings:     3 views of the right foot show bony erosion and destruction at the base  of the fourth and fifth metatarsals.  There is also bony erosion and  destruction involving the lateral cuneiform and the cuboid.  Overlying  soft tissue swelling is present.       Impression:      Impression:     Bony erosion and destruction compatible with osteomyelitis involving the  base of the fourth and  fifth metatarsals as well as the lateral  cuneiform and cuboid bones.     This report was finalized on 8/20/2023 8:41 PM by Leonard Shaffer MD.             ----------------------------------------------------------------------------------------------------------------------  Assessment and Plan:    Right foot osteomyelitis -appreciate general surgery recommendations, will plan for right BKA tomorrow.  We will make n.p.o. at midnight, continue empiric antibiotic therapy with meropenem and vancomycin.    2.  Type 2 diabetes mellitus -continue Accu-Cheks before every meal and nightly with sliding scale insulin    3.  Essential hypertension -blood pressure not well controlled, will start losartan 50 mg p.o. daily.  Cannot start ACE inhibitor secondary to listed allergy (although allergy is listed as cough and not considered true allergy)    4.  Obesity -complicates all aspects of care    Disposition plan for right lower extremity BKA tomorrow    Jeramie Andrade,    08/21/23   16:46 EDT

## 2023-08-22 VITALS
RESPIRATION RATE: 20 BRPM | OXYGEN SATURATION: 99 % | SYSTOLIC BLOOD PRESSURE: 170 MMHG | BODY MASS INDEX: 39.68 KG/M2 | DIASTOLIC BLOOD PRESSURE: 85 MMHG | TEMPERATURE: 97.6 F | HEIGHT: 72 IN | HEART RATE: 76 BPM | WEIGHT: 293 LBS

## 2023-08-22 LAB
ANION GAP SERPL CALCULATED.3IONS-SCNC: 10.3 MMOL/L (ref 5–15)
BUN SERPL-MCNC: 6 MG/DL (ref 6–20)
BUN/CREAT SERPL: 9.8 (ref 7–25)
CALCIUM SPEC-SCNC: 8.8 MG/DL (ref 8.6–10.5)
CHLORIDE SERPL-SCNC: 106 MMOL/L (ref 98–107)
CO2 SERPL-SCNC: 18.7 MMOL/L (ref 22–29)
CREAT SERPL-MCNC: 0.61 MG/DL (ref 0.57–1)
DEPRECATED RDW RBC AUTO: 49.3 FL (ref 37–54)
EGFRCR SERPLBLD CKD-EPI 2021: 116.8 ML/MIN/1.73
ERYTHROCYTE [DISTWIDTH] IN BLOOD BY AUTOMATED COUNT: 18.7 % (ref 12.3–15.4)
GLUCOSE BLDC GLUCOMTR-MCNC: 113 MG/DL (ref 70–130)
GLUCOSE BLDC GLUCOMTR-MCNC: 98 MG/DL (ref 70–130)
GLUCOSE SERPL-MCNC: 87 MG/DL (ref 65–99)
HCT VFR BLD AUTO: 30.5 % (ref 34–46.6)
HGB BLD-MCNC: 9.4 G/DL (ref 12–15.9)
MCH RBC QN AUTO: 22.4 PG (ref 26.6–33)
MCHC RBC AUTO-ENTMCNC: 30.8 G/DL (ref 31.5–35.7)
MCV RBC AUTO: 72.8 FL (ref 79–97)
PLATELET # BLD AUTO: 506 10*3/MM3 (ref 140–450)
PMV BLD AUTO: 10.4 FL (ref 6–12)
POTASSIUM SERPL-SCNC: 3.8 MMOL/L (ref 3.5–5.2)
RBC # BLD AUTO: 4.19 10*6/MM3 (ref 3.77–5.28)
SODIUM SERPL-SCNC: 135 MMOL/L (ref 136–145)
WBC NRBC COR # BLD: 7.93 10*3/MM3 (ref 3.4–10.8)

## 2023-08-22 PROCEDURE — 25010000002 VANCOMYCIN 5 G RECONSTITUTED SOLUTION: Performed by: HOSPITALIST

## 2023-08-22 PROCEDURE — 25010000002 LABETALOL 5 MG/ML SOLUTION: Performed by: INTERNAL MEDICINE

## 2023-08-22 PROCEDURE — 82948 REAGENT STRIP/BLOOD GLUCOSE: CPT

## 2023-08-22 PROCEDURE — 25010000002 MEROPENEM PER 100 MG: Performed by: HOSPITALIST

## 2023-08-22 PROCEDURE — 80048 BASIC METABOLIC PNL TOTAL CA: CPT | Performed by: INTERNAL MEDICINE

## 2023-08-22 PROCEDURE — 25010000002 HYDRALAZINE PER 20 MG: Performed by: HOSPITALIST

## 2023-08-22 PROCEDURE — 85027 COMPLETE CBC AUTOMATED: CPT | Performed by: INTERNAL MEDICINE

## 2023-08-22 PROCEDURE — 25010000002 MORPHINE PER 10 MG: Performed by: INTERNAL MEDICINE

## 2023-08-22 RX ORDER — ALLOPURINOL 100 MG/1
100 TABLET ORAL DAILY
Status: DISCONTINUED | OUTPATIENT
Start: 2023-08-22 | End: 2023-08-22 | Stop reason: HOSPADM

## 2023-08-22 RX ORDER — IBUPROFEN 800 MG/1
800 TABLET ORAL EVERY 8 HOURS PRN
Status: DISCONTINUED | OUTPATIENT
Start: 2023-08-22 | End: 2023-08-22 | Stop reason: HOSPADM

## 2023-08-22 RX ORDER — QUETIAPINE FUMARATE 300 MG/1
300 TABLET, FILM COATED ORAL NIGHTLY
Status: DISCONTINUED | OUTPATIENT
Start: 2023-08-22 | End: 2023-08-22 | Stop reason: HOSPADM

## 2023-08-22 RX ORDER — ONDANSETRON 2 MG/ML
4 INJECTION INTRAMUSCULAR; INTRAVENOUS EVERY 6 HOURS PRN
Status: DISCONTINUED | OUTPATIENT
Start: 2023-08-22 | End: 2023-08-22 | Stop reason: HOSPADM

## 2023-08-22 RX ORDER — BUMETANIDE 1 MG/1
1 TABLET ORAL DAILY
Status: DISCONTINUED | OUTPATIENT
Start: 2023-08-22 | End: 2023-08-22 | Stop reason: HOSPADM

## 2023-08-22 RX ORDER — CLONIDINE HYDROCHLORIDE 0.3 MG/1
0.3 TABLET ORAL EVERY 12 HOURS SCHEDULED
Status: DISCONTINUED | OUTPATIENT
Start: 2023-08-22 | End: 2023-08-22

## 2023-08-22 RX ORDER — GABAPENTIN 400 MG/1
800 CAPSULE ORAL EVERY 6 HOURS PRN
Status: DISCONTINUED | OUTPATIENT
Start: 2023-08-22 | End: 2023-08-22 | Stop reason: HOSPADM

## 2023-08-22 RX ORDER — LABETALOL HYDROCHLORIDE 5 MG/ML
10 INJECTION, SOLUTION INTRAVENOUS ONCE
Status: COMPLETED | OUTPATIENT
Start: 2023-08-22 | End: 2023-08-22

## 2023-08-22 RX ORDER — LOSARTAN POTASSIUM 50 MG/1
100 TABLET ORAL
Status: DISCONTINUED | OUTPATIENT
Start: 2023-08-22 | End: 2023-08-22 | Stop reason: HOSPADM

## 2023-08-22 RX ORDER — TOPIRAMATE 25 MG/1
100 TABLET ORAL DAILY
Status: DISCONTINUED | OUTPATIENT
Start: 2023-08-22 | End: 2023-08-22 | Stop reason: HOSPADM

## 2023-08-22 RX ORDER — BUPROPION HYDROCHLORIDE 150 MG/1
150 TABLET, EXTENDED RELEASE ORAL 2 TIMES DAILY
Status: DISCONTINUED | OUTPATIENT
Start: 2023-08-22 | End: 2023-08-22 | Stop reason: HOSPADM

## 2023-08-22 RX ORDER — CLONIDINE HYDROCHLORIDE 0.3 MG/1
0.3 TABLET ORAL 2 TIMES DAILY
Status: DISCONTINUED | OUTPATIENT
Start: 2023-08-22 | End: 2023-08-22 | Stop reason: HOSPADM

## 2023-08-22 RX ORDER — FERROUS SULFATE 325(65) MG
325 TABLET ORAL
Status: DISCONTINUED | OUTPATIENT
Start: 2023-08-22 | End: 2023-08-22 | Stop reason: HOSPADM

## 2023-08-22 RX ADMIN — MORPHINE SULFATE 4 MG: 4 INJECTION, SOLUTION INTRAMUSCULAR; INTRAVENOUS at 09:47

## 2023-08-22 RX ADMIN — MORPHINE SULFATE 4 MG: 4 INJECTION, SOLUTION INTRAMUSCULAR; INTRAVENOUS at 00:52

## 2023-08-22 RX ADMIN — SODIUM CHLORIDE 100 ML/HR: 9 INJECTION, SOLUTION INTRAVENOUS at 05:33

## 2023-08-22 RX ADMIN — LABETALOL HYDROCHLORIDE 10 MG: 5 INJECTION, SOLUTION INTRAVENOUS at 02:48

## 2023-08-22 RX ADMIN — Medication 10 ML: at 09:47

## 2023-08-22 RX ADMIN — CLONIDINE HYDROCHLORIDE 0.3 MG: 0.3 TABLET ORAL at 09:47

## 2023-08-22 RX ADMIN — TOPIRAMATE 100 MG: 25 TABLET, FILM COATED ORAL at 12:00

## 2023-08-22 RX ADMIN — MORPHINE SULFATE 4 MG: 4 INJECTION, SOLUTION INTRAMUSCULAR; INTRAVENOUS at 07:29

## 2023-08-22 RX ADMIN — MEROPENEM 1000 MG: 1 INJECTION, POWDER, FOR SOLUTION INTRAVENOUS at 00:48

## 2023-08-22 RX ADMIN — ALLOPURINOL 100 MG: 100 TABLET ORAL at 12:00

## 2023-08-22 RX ADMIN — VANCOMYCIN HYDROCHLORIDE 1500 MG: 5 INJECTION, POWDER, LYOPHILIZED, FOR SOLUTION INTRAVENOUS at 05:22

## 2023-08-22 RX ADMIN — HYDRALAZINE HYDROCHLORIDE 10 MG: 20 INJECTION INTRAMUSCULAR; INTRAVENOUS at 05:33

## 2023-08-22 RX ADMIN — BUPROPION HYDROCHLORIDE 150 MG: 150 TABLET, EXTENDED RELEASE ORAL at 12:00

## 2023-08-22 RX ADMIN — MORPHINE SULFATE 4 MG: 4 INJECTION, SOLUTION INTRAMUSCULAR; INTRAVENOUS at 05:22

## 2023-08-22 RX ADMIN — MORPHINE SULFATE 4 MG: 4 INJECTION, SOLUTION INTRAMUSCULAR; INTRAVENOUS at 03:09

## 2023-08-22 RX ADMIN — CLONIDINE HYDROCHLORIDE 0.3 MG: 0.3 TABLET ORAL at 12:00

## 2023-08-22 RX ADMIN — LOSARTAN POTASSIUM 100 MG: 50 TABLET, FILM COATED ORAL at 09:47

## 2023-08-22 NOTE — NURSING NOTE
"Patient stated she wanted to be discharged or pt will sign out ama. Doctor was paged two times and secured chatted once about the patient concerns. Pt called nurse and stated \"family is here. I want to leave ama; bring the papers.\" Nurse was awaiting MD reply. Pt came to nurses station and stated she is leaving. She needs to get to Call to get another opinion on condition. RN educated the patient on the medical risk of signing out ama and the benefits of staying in hospital. Pt voiced understanding and stated she did not want to wait for MD or discharge to be ordered. Pt stated she is leaving. Pt stated picc line was place outside of hospital and she needs it for IV abx at home and it is staying in. PICC line was left in place. Pt signed ama form and left. MD paged again for an update. RN awaiting MD call.    " none

## 2023-08-22 NOTE — DISCHARGE SUMMARY
[facility] HOSPITALIST MEDICINE DISCHARGE SUMMARY    Patient Identification:  Name:  Megan Solano  Age:  38 y.o.  Sex:  female  :  1985  MRN:  [id number]  Visit Number:  [id number]    Date of Admission: 2023  Date of Discharge: 2023    PCP: Nupur Cohn, ANTWAN    DISCHARGE DIAGNOSIS   Right foot osteomyelitis  Type 2 diabetes mellitus  Essential hypertension  Morbid obesity      CONSULTS  Dr. Juarez, General Surgery      PROCEDURES PERFORMED   None      HOSPITAL COURSE  Ms. Solano is a 38 y.o. female who presented to [facility] ED on 2023 with a chief complaint of right foot pain.  Patient has a past medical history remarkable for Charcot foot secondary to type 2 diabetes mellitus, essential hypertension, psoriatic arthritis and morbid obesity.  Patient had recently been admitted to [facility] earlier this month for evaluation of diabetic foot ulcer on her right foot.  Patient was found to have osteomyelitis at that time and general surgery services did recommend amputation of her right foot.  However, patient refused amputation at that time and conservative approach was sought with resection of 2 metatarsal bones.  Patient was then placed on IV vancomycin and Invanz and discharged home to complete a 6-week course of IV antibiotic therapy.  Patient had been attending wound care clinic since her discharge.  Unfortunately, patient's right foot wound continued to worsen with development of purulent discharge/drainage along with streaking redness up her right lower extremity.  For this reason, patient did present to our emergency department for further treatment and evaluation.  Initial evaluation in the emergency department did consist of basic laboratory work as well as physical exam and vital signs.  Please see initial H&P for specific details.  Patient did have CT of right lower extremity which demonstrated large soft tissue defect  along the plantar aspect of the lateral midfoot with appearance of bony destruction in the adjacent cuboid.  This was highly suggestive of acute osteomyelitis.  As such, patient was admitted for further treatment and evaluation.    Patient was started on empiric antibiotic therapy with vancomycin and meropenem.  General surgery services were consulted who did thoroughly evaluate the patient.  After thorough evaluation, recommendation was again made for patient to undergo right BKA.  However, patient was not interested in this procedure at this time.  Patient was highly encouraged to reconsider as it is highly unlikely patient's current infection will resolve with IV antibiotics alone.  Patient was again encouraged this is standard of care for treatment in osteomyelitis refractory to IV antibiotic therapy especially in the setting of newly developed purulence and drainage.  However, patient stated she did not want to have her right foot amputated and as such stated she wished to leave our hospital and seek another opinion at the [facility].  Patient was also hypertensive during her hospitalization.  Attempts were being made at lowering patient's blood pressure prior to discharge.  However, patient became anxious as family members were in the hospital to help take her home.  Patient then requested to leave AGAINST MEDICAL ADVICE.  Patient's blood pressure on the morning of 8/22/2023 was elevated at 190/100.  Patient was restarted on home dose clonidine in addition to losartan and Coreg.  Patient's blood pressure did improve but was still quite elevated at 170/85.  Patient was not interested in staying in our hospital any longer to address her elevated blood pressure and as such requested to leave AGAINST MEDICAL ADVICE.  Note is made of patient's nurse attempts at contacting me via telephone paging system and once on our electronic medical record system.  Unfortunately, was unable to return these pages  within the hour secondary to responding to an urgent issue with another patient at that very moment.  When I responded to the patient's nurses page, this was approximately 1 hour after initial attempt at paging me regarding patient's request to leave AMA.  Within this hour, patient proceeded to leave AGAINST MEDICAL ADVICE prior to me having the opportunity to discuss the patient's case with the patient's nurse any further.  Patient did leave the hospital AGAINST MEDICAL ADVICE with PICC line in place.  Plan was for patient to be discharged home with PICC line in place to resume home antibiotic therapy while pursuing second opinion at the Psychiatric.  While no official follow-up was ordered prior to discharge, patient is intending to return home to resume antibiotic therapy as previously prescribed with previously placed PICC line.  It is felt patient is at high risk for readmission in the near future secondary to worsening right diabetic foot ulcer/osteomyelitis.  Have strongly encourage patient to follow-up with Psychiatric if that is her desire as soon as possible.      VITAL SIGNS:      08/20/23  1601 08/21/23  0443   Weight: 126 kg (277 lb) (!) 142 kg (313 lb 0.9 oz)     Body mass index is 42.46 kg/mý.    PHYSICAL EXAM:  Constitutional: Morbidly obese  female in no apparent distress.     HENT:  Head:  Normocephalic and atraumatic.  Mouth:  Moist mucous membranes.    Eyes:  Conjunctivae and EOM are normal.  Pupils are equal, round, and reactive to light.  No scleral icterus.    Neck:  Neck supple. No thyromegaly.  No JVD present.    Cardiovascular:  Regular rate and rhythm with no murmurs, rubs, clicks or gallops appreciated.  Pulmonary/Chest:  Clear to auscultation bilaterally with no crackles, wheezes or rhonchi appreciated.  Abdominal:  Soft. Nontender. Nondistended  Bowel sounds are normal in all four quadrants. No organomegally appreciated.   Musculoskeletal: Right foot/ankle  wrapped in Ace bandage with dressing applied.    Neurological:  Alert, Cranial nerves II-XII intact with no focal deficits.  No facial droop.  No slurred speech.   Skin:  Warm and dry to palpation with no rashes or lesions appreciated.  Peripheral vascular:  2+ radial and pedal pulses in bilateral upper and lower extremities.  Psychiatric:  Alert and oriented x3, demonstrates appropriate judgment and insight    DISCHARGE DISPOSITION   ADITI Andrade DO  08/22/23  15:41 EDT    Please note that this discharge summary required more than 30 minutes to complete.    Please send a copy of this dictation to the following providers:  Nupur Cohn APRN

## 2023-08-22 NOTE — PLAN OF CARE
"Goal Outcome Evaluation:              Outcome Evaluation: Patient resting in bed at this time. Patients BP has been elevated this shift, URIEL Mariee aware, see orders. C/o pain, PRN medications given per MAR. Wound care completed per orders. Patient is refusing to take a surgery shower, stating \"I probably won't be doing the surgery anyway\"; URIEL Mariee aware. Will continue plan of care.         "

## 2023-08-22 NOTE — NURSING NOTE
"At 1336, MD was notified that the pt signed out ama and left with picc line per pt request (see nursing note). MD stated, \"good. well, she will probably need the picc at home for the continued IV abx.\"     "

## 2023-08-22 NOTE — CASE MANAGEMENT/SOCIAL WORK
Discharge Planning Assessment  CATARINO Reardon     Patient Name: Megan Solano  MRN: [id number]  Today's Date: 8/22/2023    Admit Date: 8/20/2023     Discharge Plan       Row Name 08/22/23 1406       Plan    Final Discharge Disposition Code 07 - left AMA    Final Note Pt left AMA. She stated she was going to [place] for another opinion for her leg. CM notified [facility] infusion pharmacy per Mike to continue previous antibiotic regimen of Vancomycin and Invanz. Pt left here with PICC from home.             Harmony Hodgson RN

## 2023-08-23 LAB
BACTERIA SPEC AEROBE CULT: NORMAL
GRAM STN SPEC: NORMAL
GRAM STN SPEC: NORMAL

## 2023-08-23 NOTE — PAYOR COMM NOTE
"CONTACT: KATE IVAN RN  UTILIZATION MANAGEMENT DEPT.  [facility]  [address] [place] [address]  PHONE: [phone]  FAX: [phone]        DISCHARGE NOTIFICATION  DC DATE: 8/22/23  LEFT AMA    REF# [id number]        Be Car (38 y.o. Female)       Date of Birth   1985    Social Security Number       Address   [address] [address]    Home Phone       MRN   [id number]       Latter-day   Unknown    Marital Status   Single                            Admission Date   8/20/23    Admission Type   Emergency    Admitting Provider   Chip Hayes MD    Attending Provider       Department, Room/Bed   [facility] [address] [place], 3340/2S       Discharge Date   8/22/2023    Discharge Disposition   Left Against Medical Advice    Discharge Destination   Home                              Attending Provider: (none)   Allergies: Penicillins, Amoxicillin, Lisinopril, Toradol [Ketorolac Tromethamine], Zofran [Ondansetron], Latex    Isolation: None   Infection: None   Code Status: Prior    Ht: 182.9 cm (72\")   Wt: 142 kg (313 lb 0.9 oz)    Admission Cmt: None   Principal Problem: Osteomyelitis of right foot [M86.9]                   Active Insurance as of 8/20/2023       Primary Coverage       Payor Plan Insurance Group Employer/Plan Group    [employer] [place] [employer]ICA[employer]        Payor Plan Address Payor Plan Phone Number Payor Plan Fax Number Effective Dates    [address] [address] [phone]  8/20/2023 - None Entered    [facility] [id number]         Subscriber Name Subscriber Birth Date Member ID       BE CAR 1985 [id number]                     Emergency Contacts        (Rel.) Home Phone Work Phone Mobile Phone    Colleen Cheung (Father) -- -- [phone]                 Discharge Summary        Jeramie Andrade DO at 08/22/23 1310              [facility] HOSPITALIST MEDICINE DISCHARGE SUMMARY    Patient " Identification:  Name:  Megan Solano  Age:  38 y.o.  Sex:  female  :  1985  MRN:  [id number]  Visit Number:  [id number]    Date of Admission: 2023  Date of Discharge: 2023    PCP: Nupur Cohn, APRN    DISCHARGE DIAGNOSIS   Right foot osteomyelitis  Type 2 diabetes mellitus  Essential hypertension  Morbid obesity      CONSULTS  Dr. Juarez, General Surgery      PROCEDURES PERFORMED   None      HOSPITAL COURSE  Ms. Solano is a 38 y.o. female who presented to [facility] ED on 2023 with a chief complaint of right foot pain.  Patient has a past medical history remarkable for Charcot foot secondary to type 2 diabetes mellitus, essential hypertension, psoriatic arthritis and morbid obesity.  Patient had recently been admitted to [facility] earlier this month for evaluation of diabetic foot ulcer on her right foot.  Patient was found to have osteomyelitis at that time and general surgery services did recommend amputation of her right foot.  However, patient refused amputation at that time and conservative approach was sought with resection of 2 metatarsal bones.  Patient was then placed on IV vancomycin and Invanz and discharged home to complete a 6-week course of IV antibiotic therapy.  Patient had been attending wound care clinic since her discharge.  Unfortunately, patient's right foot wound continued to worsen with development of purulent discharge/drainage along with streaking redness up her right lower extremity.  For this reason, patient did present to our emergency department for further treatment and evaluation.  Initial evaluation in the emergency department did consist of basic laboratory work as well as physical exam and vital signs.  Please see initial H&P for specific details.  Patient did have CT of right lower extremity which demonstrated large soft tissue defect along the plantar aspect of the lateral midfoot with appearance of bony  destruction in the adjacent cuboid.  This was highly suggestive of acute osteomyelitis.  As such, patient was admitted for further treatment and evaluation.    Patient was started on empiric antibiotic therapy with vancomycin and meropenem.  General surgery services were consulted who did thoroughly evaluate the patient.  After thorough evaluation, recommendation was again made for patient to undergo right BKA.  However, patient was not interested in this procedure at this time.  Patient was highly encouraged to reconsider as it is highly unlikely patient's current infection will resolve with IV antibiotics alone.  Patient was again encouraged this is standard of care for treatment in osteomyelitis refractory to IV antibiotic therapy especially in the setting of newly developed purulence and drainage.  However, patient stated she did not want to have her right foot amputated and as such stated she wished to leave our hospital and seek another opinion at the [facility].  Patient was also hypertensive during her hospitalization.  Attempts were being made at lowering patient's blood pressure prior to discharge.  However, patient became anxious as family members were in the hospital to help take her home.  Patient then requested to leave AGAINST MEDICAL ADVICE.  Patient's blood pressure on the morning of 8/22/2023 was elevated at 190/100.  Patient was restarted on home dose clonidine in addition to losartan and Coreg.  Patient's blood pressure did improve but was still quite elevated at 170/85.  Patient was not interested in staying in our hospital any longer to address her elevated blood pressure and as such requested to leave AGAINST MEDICAL ADVICE.  Note is made of patient's nurse attempts at contacting me via telephone paging system and once on our electronic medical record system.  Unfortunately, was unable to return these pages within the hour secondary to responding to an urgent issue with another  patient at that very moment.  When I responded to the patient's nurses page, this was approximately 1 hour after initial attempt at paging me regarding patient's request to leave AMA.  Within this hour, patient proceeded to leave AGAINST MEDICAL ADVICE prior to me having the opportunity to discuss the patient's case with the patient's nurse any further.  Patient did leave the hospital AGAINST MEDICAL ADVICE with PICC line in place.  Plan was for patient to be discharged home with PICC line in place to resume home antibiotic therapy while pursuing second opinion at the [facility].  While no official follow-up was ordered prior to discharge, patient is intending to return home to resume antibiotic therapy as previously prescribed with previously placed PICC line.  It is felt patient is at high risk for readmission in the near future secondary to worsening right diabetic foot ulcer/osteomyelitis.  Have strongly encourage patient to follow-up with [facility] if that is her desire as soon as possible.      VITAL SIGNS:      08/20/23  1601 08/21/23  0443   Weight: 126 kg (277 lb) (!) 142 kg (313 lb 0.9 oz)     Body mass index is 42.46 kg/mý.    PHYSICAL EXAM:  Constitutional: Morbidly obese  female in no apparent distress.     HENT:  Head:  Normocephalic and atraumatic.  Mouth:  Moist mucous membranes.    Eyes:  Conjunctivae and EOM are normal.  Pupils are equal, round, and reactive to light.  No scleral icterus.    Neck:  Neck supple. No thyromegaly.  No JVD present.    Cardiovascular:  Regular rate and rhythm with no murmurs, rubs, clicks or gallops appreciated.  Pulmonary/Chest:  Clear to auscultation bilaterally with no crackles, wheezes or rhonchi appreciated.  Abdominal:  Soft. Nontender. Nondistended  Bowel sounds are normal in all four quadrants. No organomegally appreciated.   Musculoskeletal: Right foot/ankle wrapped in Ace bandage with dressing applied.    Neurological:  Alert,  Cranial nerves II-XII intact with no focal deficits.  No facial droop.  No slurred speech.   Skin:  Warm and dry to palpation with no rashes or lesions appreciated.  Peripheral vascular:  2+ radial and pedal pulses in bilateral upper and lower extremities.  Psychiatric:  Alert and oriented x3, demonstrates appropriate judgment and insight    DISCHARGE DISPOSITION   ADITI Andrade DO  08/22/23  15:41 EDT    Please note that this discharge summary required more than 30 minutes to complete.    Please send a copy of this dictation to the following providers:  Nupur Cohn APRN    Electronically signed by Jeramie Andrade DO at 08/22/23 5008

## 2023-08-25 LAB
BACTERIA SPEC AEROBE CULT: NORMAL
BACTERIA SPEC AEROBE CULT: NORMAL